# Patient Record
Sex: FEMALE | Race: WHITE | NOT HISPANIC OR LATINO | Employment: PART TIME | URBAN - METROPOLITAN AREA
[De-identification: names, ages, dates, MRNs, and addresses within clinical notes are randomized per-mention and may not be internally consistent; named-entity substitution may affect disease eponyms.]

---

## 2023-09-13 ENCOUNTER — APPOINTMENT (EMERGENCY)
Dept: RADIOLOGY | Facility: HOSPITAL | Age: 33
End: 2023-09-13

## 2023-09-13 ENCOUNTER — HOSPITAL ENCOUNTER (EMERGENCY)
Facility: HOSPITAL | Age: 33
Discharge: HOME | End: 2023-09-13
Attending: EMERGENCY MEDICINE | Admitting: EMERGENCY MEDICINE

## 2023-09-13 VITALS
BODY MASS INDEX: 28.59 KG/M2 | HEART RATE: 68 BPM | TEMPERATURE: 97.8 F | RESPIRATION RATE: 16 BRPM | SYSTOLIC BLOOD PRESSURE: 117 MMHG | WEIGHT: 161.38 LBS | OXYGEN SATURATION: 100 % | DIASTOLIC BLOOD PRESSURE: 80 MMHG | HEIGHT: 63 IN

## 2023-09-13 DIAGNOSIS — S93.401A RIGHT ANKLE SPRAIN: Primary | ICD-10-CM

## 2023-09-13 PROCEDURE — 99283 EMERGENCY DEPT VISIT LOW MDM: CPT

## 2023-09-13 PROCEDURE — 73610 X-RAY EXAM OF ANKLE: CPT

## 2023-09-13 RX ORDER — LAMOTRIGINE 200 MG/1
200 TABLET ORAL DAILY
COMMUNITY

## 2023-09-13 NOTE — Clinical Note
Tirsoannitashruti Johns was seen and treated in our emergency department on 9/13/2023. Diagnosis:     Padmini Burger  . She may return on this date: 09/15/2023         If you have any questions or concerns, please don't hesitate to call.       Echo Mckinley RN    ______________________________           _______________          _______________  Hospital Representative                              Date                                Time

## 2023-09-14 NOTE — ED PROVIDER NOTES
History  Chief Complaint   Patient presents with   • Ankle Pain     Right ankle injury a month and a half ago. Patient reports pain and swelling not getting better. Patient presents for evaluation of right ankle pain and swelling. Patient states she twisted it 1.5 months ago. States still swells up at the end of the day. Did not have it examined previously. Is able to bear weight. History provided by:  Patient   used: No    Ankle Pain      Prior to Admission Medications   Prescriptions Last Dose Informant Patient Reported? Taking?   lamoTRIgine (LaMICtal) 200 MG tablet   Yes Yes   Sig: Take 200 mg by mouth daily      Facility-Administered Medications: None       History reviewed. No pertinent past medical history. History reviewed. No pertinent surgical history. History reviewed. No pertinent family history. I have reviewed and agree with the history as documented. E-Cigarette/Vaping   • E-Cigarette Use Former User      E-Cigarette/Vaping Substances   • Flavoring Yes      Social History     Tobacco Use   • Smoking status: Every Day     Packs/day: 0.25     Types: Cigarettes   • Smokeless tobacco: Never   Vaping Use   • Vaping Use: Former   • Substances: Flavoring   Substance Use Topics   • Alcohol use: Never   • Drug use: Yes     Types: Marijuana       Review of Systems   All other systems reviewed and are negative. Physical Exam  Physical Exam  Vitals and nursing note reviewed. Constitutional:       General: She is not in acute distress. Musculoskeletal:         General: Tenderness present. No deformity. Normal range of motion. Feet:    Neurological:      General: No focal deficit present. Mental Status: She is alert and oriented to person, place, and time.          Vital Signs  ED Triage Vitals   Temperature Pulse Respirations Blood Pressure SpO2   09/13/23 1608 09/13/23 1502 09/13/23 1502 09/13/23 1502 09/13/23 1502   97.8 °F (36.6 °C) 68 16 117/80 100 % Temp Source Heart Rate Source Patient Position - Orthostatic VS BP Location FiO2 (%)   09/13/23 1502 09/13/23 1502 -- -- --   Oral Monitor         Pain Score       09/13/23 1502       7           Vitals:    09/13/23 1502   BP: 117/80   Pulse: 68         Visual Acuity      ED Medications  Medications - No data to display    Diagnostic Studies  Results Reviewed     None                 XR ankle 3+ views RIGHT   Final Result by Gwenetta Klinefelter, MD (09/13 1602)      No acute osseous abnormality. Workstation performed: [id number]                    Procedures  Procedures         ED Course                               SBIRT 22yo+    Flowsheet Row Most Recent Value   Initial Alcohol Screen: US AUDIT-C     1. How often do you have a drink containing alcohol? 0 Filed at: 09/13/2023 1509   Audit-C Score 0 Filed at: 09/13/2023 1509   SHABNAM: How many times in the past year have you. .. Used an illegal drug or used a prescription medication for non-medical reasons? Never Filed at: 09/13/2023 1509                    Medical Decision Making  Pulse ox 100% on RA indicating adequate oxygenation  Xray R ankle: no fx or dislocation as read by me    Right ankle sprain: acute illness or injury  Amount and/or Complexity of Data Reviewed  Radiology: ordered and independent interpretation performed. Disposition  Final diagnoses:   Right ankle sprain     Time reflects when diagnosis was documented in both MDM as applicable and the Disposition within this note     Time User Action Codes Description Comment    9/13/2023  3:59 PM Jojo Harper [O53.225J] Right ankle sprain       ED Disposition     ED Disposition   Discharge    Condition   Stable    Date/Time   Wed Sep 13, 2023  3:59 PM    [address] [place] [address] discharge to home/self care.                Follow-up Information    None         Discharge Medication List as of 9/13/2023  3:59 PM      CONTINUE these medications which have NOT CHANGED    Details lamoTRIgine (LaMICtal) 200 MG tablet Take 200 mg by mouth daily, Historical Med                 PDMP Review     None          ED Provider  Electronically Signed by           Meryle Doctor, DO  09/14/23 8146

## 2023-09-19 ENCOUNTER — TELEPHONE (OUTPATIENT)
Age: 33
End: 2023-09-19

## 2023-09-19 NOTE — TELEPHONE ENCOUNTER
Caller: pt    Doctor: Manjinder Fu    Reason for call: Unable to make appt for today would like to know if she can be prescribed medication until her rescheduled appt. Advised her to reach out to primary since she has not  Been seen before state she does not have one.      Call back#: [phone]

## 2023-09-19 NOTE — TELEPHONE ENCOUNTER
Called and [facility] for Mary Ann Jacksontemitope and advised she is not under our care yet, so we cannot prescribe any medication or give medication advise until her appointment. She can try to get in with PCP, possibly [facility]s In, or urgent care if pain is that bad. I apologized for the inconvenience but she is not established with our practice yet.

## 2023-09-20 ENCOUNTER — TELEPHONE (OUTPATIENT)
Age: 33
End: 2023-09-20

## 2023-09-20 NOTE — TELEPHONE ENCOUNTER
Please set this up and contact patient. Please let her know that this is not a guaranteed ride this is a courtesy. [employer] calls to request the ride and provided the payment. There is also no insurance on file, please make sure patient is aware of self pay policy.

## 2023-09-20 NOTE — TELEPHONE ENCOUNTER
Chief Complaint   Patient presents with    Headache     started 3-4 days ago with all symptoms    Cough    Vomiting     nauseated at random times. vomits mainly in the AM   [facility] Follow Up     seen at [facility] [place] for headaches and blood sugar on 10/16/17     \"REVIEWED RECORD IN PREPARATION FOR VISIT AND HAVE OBTAINED THE NECESSARY DOCUMENTATION\"  1. Have you been to the ER, urgent care clinic since your last visit? Hospitalized since your last visit? Yes Reason for visit: see above    2. Have you seen or consulted any other health care providers outside of the [address] [place] [address] since your last visit? Include any pap smears or colon screening. No  Patient does not have advanced directives. Spoke with patient, advised that lyft ride has been set up, relayed message below. Also advised patient that if her insurance is the [facility] we unfortunately do not participate with that insurance and she would need to call her insurance to find another provider. Patient stated she does believe it was the [facility] but will call back to verify her insurance before her appt.

## 2023-09-20 NOTE — TELEPHONE ENCOUNTER
Caller: Patient    Doctor: Jamye Mccartney    Reason for call:     Patient is requesting a lyft for Dr appointment on 9/27/23 at 10:30 am for Dr Jayme Mccartney at [place] [address]. Please give her a call  Back to help to schedule.     Call back#: [phone]

## 2023-10-04 ENCOUNTER — HOSPITAL ENCOUNTER (EMERGENCY)
Facility: HOSPITAL | Age: 33
Discharge: HOME | End: 2023-10-04
Attending: EMERGENCY MEDICINE

## 2023-10-04 VITALS
TEMPERATURE: 99.8 F | SYSTOLIC BLOOD PRESSURE: 120 MMHG | HEART RATE: 88 BPM | OXYGEN SATURATION: 97 % | DIASTOLIC BLOOD PRESSURE: 86 MMHG | RESPIRATION RATE: 16 BRPM

## 2023-10-04 DIAGNOSIS — F31.9 BIPOLAR DISORDER (HCC): ICD-10-CM

## 2023-10-04 DIAGNOSIS — Z76.0 MEDICATION REFILL: Primary | ICD-10-CM

## 2023-10-04 PROCEDURE — 99281 EMR DPT VST MAYX REQ PHY/QHP: CPT

## 2023-10-04 PROCEDURE — 99284 EMERGENCY DEPT VISIT MOD MDM: CPT | Performed by: EMERGENCY MEDICINE

## 2023-10-04 RX ORDER — LAMOTRIGINE 200 MG/1
200 TABLET ORAL DAILY
Qty: 30 TABLET | Refills: 0 | Status: SHIPPED | OUTPATIENT
Start: 2023-10-04

## 2023-10-04 NOTE — ED PROVIDER NOTES
History  Chief Complaint   Patient presents with   • Medication Refill     Only has one lexapro. Just moved from [place]     68-year-old female with past history of bipolar disorder, presents to the ED for medication refill. Patient states that she recently moved from [place] to [facility] about 2 months ago. Patient has had some insurance issues. Patient recently obtained new insurance. Patient takes Lamictal once daily for her bipolar disorder. Patient ran out of this medication today. Subsequently patient came to the ED for medication refill. Patient has no other complaints. History provided by:  Patient  Medication Refill      Prior to Admission Medications   Prescriptions Last Dose Informant Patient Reported? Taking?   lamoTRIgine (LaMICtal) 200 MG tablet   Yes No   Sig: Take 200 mg by mouth daily      Facility-Administered Medications: None       Past Medical History:   Diagnosis Date   • Psychiatric disorder        Past Surgical History:   Procedure Laterality Date   •  SECTION         History reviewed. No pertinent family history. I have reviewed and agree with the history as documented. E-Cigarette/Vaping   • E-Cigarette Use Current Every Day User      E-Cigarette/Vaping Substances   • Flavoring Yes      Social History     Tobacco Use   • Smoking status: Every Day     Packs/day: 0.25     Types: Cigarettes   • Smokeless tobacco: Never   Vaping Use   • Vaping Use: Every day   • Substances: Flavoring   Substance Use Topics   • Alcohol use: Yes     Comment: socially   • Drug use: Yes     Types: Marijuana       Review of Systems   Constitutional: Negative for chills and fever. HENT: Negative for ear pain and sore throat. Eyes: Negative for pain and visual disturbance. Respiratory: Negative for cough and shortness of breath. Cardiovascular: Negative for chest pain and palpitations. Gastrointestinal: Negative for abdominal pain and vomiting.    Genitourinary: Negative for dysuria and hematuria. Musculoskeletal: Negative for arthralgias and back pain. Skin: Negative for color change and rash. Neurological: Negative for seizures and syncope. All other systems reviewed and are negative. Physical Exam  Physical Exam  Vitals and nursing note reviewed. Constitutional:       General: She is not in acute distress. Appearance: She is well-developed. HENT:      Head: Normocephalic and atraumatic. Eyes:      Conjunctiva/sclera: Conjunctivae normal.   Cardiovascular:      Rate and Rhythm: Normal rate and regular rhythm. Heart sounds: No murmur heard. Pulmonary:      Effort: Pulmonary effort is normal. No respiratory distress. Breath sounds: Normal breath sounds. Abdominal:      Palpations: Abdomen is soft. Tenderness: There is no abdominal tenderness. Musculoskeletal:         General: No swelling. Cervical back: Neck supple. Skin:     General: Skin is warm and dry. Capillary Refill: Capillary refill takes less than 2 seconds. Neurological:      Mental Status: She is alert. Psychiatric:         Mood and Affect: Mood normal.         Vital Signs  ED Triage Vitals [10/04/23 1530]   Temperature Pulse Respirations Blood Pressure SpO2   99.8 °F (37.7 °C) 88 16 120/86 97 %      Temp Source Heart Rate Source Patient Position - Orthostatic VS BP Location FiO2 (%)   Tympanic Monitor Sitting Right arm --      Pain Score       No Pain           Vitals:    10/04/23 1530   BP: 120/86   Pulse: 88   Patient Position - Orthostatic VS: Sitting         Visual Acuity      ED Medications  Medications - No data to display    Diagnostic Studies  Results Reviewed     None                 No orders to display              Procedures  Procedures         ED Course                                             Medical Decision Making  Patient presented for medication refill. Patient's Lamictal was refilled after verifying her prescription bottle.   At this time patient referred to some PCPs in our network to establish visit with her primary care physician. Close return instructions given to return to the ER for any worsening symptoms. Patient agrees with discharge plan. Patient well appearing at time of discharge. Please Note: Fluency Direct voice recognition software may have been used in the creation of this document. Wrong words or sound a like substitutions may have occurred due to the inherent limitations of the voice software. Risk  Prescription drug management. Disposition  Final diagnoses:   Medication refill   Bipolar disorder ([address])     Time reflects when diagnosis was documented in both MDM as applicable and the Disposition within this note     Time User Action Codes Description Comment    10/4/2023  3:48 PM Cristin Ray Add [Z76.0] Medication refill     10/4/2023  3:48 PM Cristin Ray Add [F31.9] Bipolar disorder [facility])       ED Disposition     ED Disposition   Discharge    Condition   Stable    Date/Time   Wed Oct 4, 2023  3:48 PM    [address] [facility] discharge to home/self care. Follow-up Information     Follow up With Specialties Details Why [facility] Schedule an appointment as soon as possible for a visit in 2 days  [address] [facility]. Schedule an appointment as soon as possible for a visit  Please call to establish visit with a primary care physician. [address]  [phone]            Patient's Medications   Discharge Prescriptions    LAMOTRIGINE (LAMICTAL) 200 MG TABLET    Take 1 tablet (200 mg total) by mouth daily       Start Date: 10/4/2023 End Date: --       Order Dose: 200 mg       Quantity: 30 tablet    Refills: 0       No discharge procedures on file.     PDMP Review     None          ED Provider  Electronically Signed by           Karen Saba DO Vanessa  10/04/23 1552

## 2023-11-02 ENCOUNTER — HOSPITAL ENCOUNTER (EMERGENCY)
Facility: HOSPITAL | Age: 33
Discharge: HOME | End: 2023-11-02
Attending: EMERGENCY MEDICINE | Admitting: EMERGENCY MEDICINE
Payer: COMMERCIAL

## 2023-11-02 VITALS
WEIGHT: 149.91 LBS | RESPIRATION RATE: 16 BRPM | OXYGEN SATURATION: 99 % | DIASTOLIC BLOOD PRESSURE: 88 MMHG | TEMPERATURE: 98.1 F | SYSTOLIC BLOOD PRESSURE: 133 MMHG | HEIGHT: 63 IN | HEART RATE: 86 BPM | BODY MASS INDEX: 26.56 KG/M2

## 2023-11-02 DIAGNOSIS — Z76.0 ENCOUNTER FOR MEDICATION REFILL: Primary | ICD-10-CM

## 2023-11-02 DIAGNOSIS — F31.9 BIPOLAR DISORDER (HCC): ICD-10-CM

## 2023-11-02 PROCEDURE — 99281 EMR DPT VST MAYX REQ PHY/QHP: CPT

## 2023-11-02 PROCEDURE — 99284 EMERGENCY DEPT VISIT MOD MDM: CPT | Performed by: PHYSICIAN ASSISTANT

## 2023-11-02 RX ORDER — LAMOTRIGINE 200 MG/1
200 TABLET ORAL DAILY
Qty: 30 TABLET | Refills: 0 | Status: SHIPPED | OUTPATIENT
Start: 2023-11-02

## 2023-11-02 NOTE — ED PROVIDER NOTES
History  Chief Complaint   Patient presents with    Medication Refill     Pt. Is unable to see a pcp until her insurance  clears. Needs a refill on her Lamictal 200mg once a day. Last took it yesterday     33 y/o female presenting requesting medication refill of her Lamictal.  States that she is waiting for her insurance to go through so that she can get a PCP appointment. Has not missed any doses. Prior to Admission Medications   Prescriptions Last Dose Informant Patient Reported? Taking?   lamoTRIgine (LaMICtal) 200 MG tablet   Yes No   Sig: Take 200 mg by mouth daily   lamoTRIgine (LaMICtal) 200 MG tablet 2023  No Yes   Sig: Take 1 tablet (200 mg total) by mouth daily   lamoTRIgine (LaMICtal) 200 MG tablet   No Yes   Sig: Take 1 tablet (200 mg total) by mouth daily      Facility-Administered Medications: None       Past Medical History:   Diagnosis Date    Psychiatric disorder        Past Surgical History:   Procedure Laterality Date     SECTION         History reviewed. No pertinent family history. I have reviewed and agree with the history as documented. E-Cigarette/Vaping    E-Cigarette Use Current Every Day User      E-Cigarette/Vaping Substances    Nicotine Yes     Flavoring Yes      Social History     Tobacco Use    Smoking status: Every Day     Packs/day: 0.25     Types: Cigarettes    Smokeless tobacco: Never   Vaping Use    Vaping Use: Every day    Substances: Nicotine, Flavoring   Substance Use Topics    Alcohol use: Yes     Comment: socially    Drug use: Not Currently     Types: Marijuana       Review of Systems   Constitutional: Negative. Negative for chills, fatigue and fever. HENT: Negative. Negative for congestion, postnasal drip, rhinorrhea and sore throat. Eyes: Negative. Respiratory: Negative. Negative for cough, shortness of breath and wheezing. Cardiovascular: Negative. Gastrointestinal: Negative.   Negative for abdominal pain, diarrhea, nausea and vomiting. Endocrine: Negative. Genitourinary: Negative. Musculoskeletal: Negative. Skin: Negative. Neurological: Negative. Hematological: Negative. Psychiatric/Behavioral: Negative. All other systems reviewed and are negative. Physical Exam  Physical Exam  Vitals and nursing note reviewed. Constitutional:       Appearance: Normal appearance. HENT:      Head: Normocephalic and atraumatic. Right Ear: External ear normal.      Left Ear: External ear normal.      Nose: Nose normal.   Eyes:      Conjunctiva/sclera: Conjunctivae normal.   Cardiovascular:      Rate and Rhythm: Normal rate. Pulmonary:      Effort: Pulmonary effort is normal.   Abdominal:      General: There is no distension. Musculoskeletal:         General: No deformity. Normal range of motion. Cervical back: Normal range of motion. Skin:     General: Skin is dry. Findings: No rash. Neurological:      General: No focal deficit present. Mental Status: She is alert and oriented to person, place, and time. Mental status is at baseline. Psychiatric:         Mood and Affect: Mood normal.         Behavior: Behavior normal.         Thought Content: Thought content normal.         Judgment: Judgment normal.         Vital Signs  ED Triage Vitals [11/02/23 1619]   Temperature Pulse Respirations Blood Pressure SpO2   98.1 °F (36.7 °C) 86 16 133/88 99 %      Temp Source Heart Rate Source Patient Position - Orthostatic VS BP Location FiO2 (%)   Oral -- -- -- --      Pain Score       --           Vitals:    11/02/23 1619   BP: 133/88   Pulse: 86         Visual Acuity      ED Medications  Medications - No data to display    Diagnostic Studies  Results Reviewed       None                   No orders to display              Procedures  Procedures         ED Course                               SBIRT 20yo+      Flowsheet Row Most Recent Value   Initial Alcohol Screen: US AUDIT-C     1.  How often do you have a drink containing alcohol? 0 Filed at: 11/02/2023 1620   2. How many drinks containing alcohol do you have on a typical day you are drinking? 0 Filed at: 11/02/2023 1620   3a. Male UNDER 65: How often do you have five or more drinks on one occasion? 0 Filed at: 11/02/2023 1620   3b. FEMALE Any Age, or MALE 65+: How often do you have 4 or more drinks on one occassion? 0 Filed at: 11/02/2023 1620   Audit-C Score 0 Filed at: 11/02/2023 1620   SHABNAM: How many times in the past year have you. .. Used an illegal drug or used a prescription medication for non-medical reasons? Never Filed at: 11/02/2023 1620                      Medical Decision Making  Discussed importance of obtaining PCP for further refills. Patient in agreement. Risk  Prescription drug management. Disposition  Final diagnoses:   Encounter for medication refill     Time reflects when diagnosis was documented in both MDM as applicable and the Disposition within this note       Time User Action Codes Description Comment    11/2/2023  4:23 PM Elida Harper [Z76.0] Encounter for medication refill     11/2/2023  4:23 PM Elida Harper [F31.9] Bipolar disorder [facility])           ED Disposition       ED Disposition   Discharge    Condition   Stable    Date/Time   Thu Nov 2, 2023 [address] discharge to home/self care.                    Follow-up Information       Follow up With Specialties Details Why Contact Info Additional Information    [address] Emergency Department Emergency Medicine Go to  If symptoms worsen, otherwise please follow up with your family doctor for further refills [address] [address]  [address] [facility] Emergency Department, [address], [address]            Current Discharge Medication List        CONTINUE these medications which have CHANGED    Details   !! lamoTRIgine (LaMICtal) 200 MG tablet Take 1 tablet (200 mg total) by mouth daily  Qty: 30 tablet, Refills: 0    Associated Diagnoses: Bipolar disorder ([address])       ! ! - Potential duplicate medications found. Please discuss with provider. CONTINUE these medications which have NOT CHANGED    Details   !! lamoTRIgine (LaMICtal) 200 MG tablet Take 200 mg by mouth daily       ! ! - Potential duplicate medications found. Please discuss with provider. No discharge procedures on file.     PDMP Review       None            ED Provider  Electronically Signed by             Michael Fisher PA-C  11/02/23 6266

## 2023-11-29 ENCOUNTER — HOSPITAL ENCOUNTER (EMERGENCY)
Facility: HOSPITAL | Age: 33
Discharge: HOME | End: 2023-11-29
Attending: EMERGENCY MEDICINE
Payer: COMMERCIAL

## 2023-11-29 VITALS
DIASTOLIC BLOOD PRESSURE: 58 MMHG | RESPIRATION RATE: 18 BRPM | HEART RATE: 78 BPM | WEIGHT: 149 LBS | SYSTOLIC BLOOD PRESSURE: 116 MMHG | OXYGEN SATURATION: 98 % | TEMPERATURE: 97.6 F | BODY MASS INDEX: 26.39 KG/M2

## 2023-11-29 DIAGNOSIS — Z76.0 ENCOUNTER FOR MEDICATION REFILL: Primary | ICD-10-CM

## 2023-11-29 PROCEDURE — 99281 EMR DPT VST MAYX REQ PHY/QHP: CPT

## 2023-11-29 PROCEDURE — 99284 EMERGENCY DEPT VISIT MOD MDM: CPT | Performed by: EMERGENCY MEDICINE

## 2023-11-29 RX ORDER — LAMOTRIGINE 200 MG/1
200 TABLET ORAL DAILY
Qty: 30 TABLET | Refills: 0 | Status: SHIPPED | OUTPATIENT
Start: 2023-11-29

## 2023-11-29 NOTE — ED PROVIDER NOTES
History  Chief Complaint   Patient presents with    Medication Refill     Req lamictal refill, unable to get to PCP since her insurance isn't resolved. Was told it will take 45 days but it has been 108 days. Pt is a 27yo F who presents for medication refill. Patient reports she is on Lamictal and has been for a long time. Patient denies any recent dosage changes. Patient reports she has been attempting to establish PCP but needs insurance to do so. Patient reports she is working with the insurance people but has not yet established insurance and therefore has not established PCP. Patient reports she is on the Lamictal for anxiety, depression, and bipolar. Patient reports she has otherwise been feeling well and is only here for medication refill. Prior to Admission Medications   Prescriptions Last Dose Informant Patient Reported? Taking?   lamoTRIgine (LaMICtal) 200 MG tablet   No No   Sig: Take 1 tablet (200 mg total) by mouth daily      Facility-Administered Medications: None       Past Medical History:   Diagnosis Date    Psychiatric disorder        Past Surgical History:   Procedure Laterality Date     SECTION         History reviewed. No pertinent family history. I have reviewed and agree with the history as documented. E-Cigarette/Vaping    E-Cigarette Use Current Every Day User      E-Cigarette/Vaping Substances    Nicotine Yes     Flavoring Yes      Social History     Tobacco Use    Smoking status: Every Day     Packs/day: 0.25     Types: Cigarettes    Smokeless tobacco: Never   Vaping Use    Vaping Use: Every day    Substances: Nicotine, Flavoring   Substance Use Topics    Alcohol use: Yes     Comment: socially    Drug use: Not Currently     Types: Marijuana       Review of Systems   All other systems reviewed and are negative. Physical Exam  Physical Exam  Vitals reviewed. Constitutional:       General: She is not in acute distress. Appearance: She is well-developed. She is not toxic-appearing or diaphoretic. HENT:      Head: Normocephalic and atraumatic. Right Ear: External ear normal.      Left Ear: External ear normal.      Nose: Nose normal.      Mouth/Throat:      Pharynx: Oropharynx is clear. Eyes:      Extraocular Movements: Extraocular movements intact. Pupils: Pupils are equal, round, and reactive to light. Cardiovascular:      Rate and Rhythm: Normal rate and regular rhythm. Heart sounds: Normal heart sounds. Pulmonary:      Effort: Pulmonary effort is normal. No respiratory distress. Breath sounds: Normal breath sounds. Abdominal:      General: There is no distension. Palpations: Abdomen is soft. Tenderness: There is no abdominal tenderness. Musculoskeletal:         General: Normal range of motion. Cervical back: Normal range of motion and neck supple. Right lower leg: No edema. Left lower leg: No edema. Skin:     General: Skin is warm and dry. Capillary Refill: Capillary refill takes less than 2 seconds. Coloration: Skin is not pale. Findings: No erythema or rash. Neurological:      General: No focal deficit present. Mental Status: She is alert and oriented to person, place, and time. Psychiatric:         Speech: Speech normal.         Behavior: Behavior is cooperative.          Vital Signs  ED Triage Vitals [11/29/23 1513]   Temperature Pulse Respirations Blood Pressure SpO2   97.6 °F (36.4 °C) 78 18 116/58 98 %      Temp Source Heart Rate Source Patient Position - Orthostatic VS BP Location FiO2 (%)   Tympanic Monitor Sitting Right arm --      Pain Score       No Pain           Vitals:    11/29/23 1513   BP: 116/58   Pulse: 78   Patient Position - Orthostatic VS: Sitting         Visual Acuity      ED Medications  Medications - No data to display    Diagnostic Studies  Results Reviewed       None                   No orders to display              Procedures  Procedures         ED Course  ED Course as of 11/29/23 1615   Wed Nov 29, 2023   1513 Per chart review, pt received 30 day supply on 11/2. Medical Decision Making  Pt is a 27yo F who presents for medication refill. Will refill medication but also encourage patient to obtain PCP. Patient given family practice clinic information as I believe they take patients without insurance. Patient expressed understanding and will establish PCP. Plan to discharge pt with f/u to PCP. Discussed returning the ED with new symptoms. Discussed taking new medication as prescribed. Pt expressed understanding of discharge instructions, return precautions, and medication instructions and is stable for discharge at this time. All questions were answered and pt was discharged without incident. Risk  Prescription drug management. Disposition  Final diagnoses:   Encounter for medication refill     Time reflects when diagnosis was documented in both MDM as applicable and the Disposition within this note       Time User Action Codes Description Comment    11/29/2023  3:15 PM Brad Chamberlain Add [Z76.0] Encounter for medication refill           ED Disposition       ED Disposition   Discharge    Condition   Stable    Date/Time   Wed Nov 29, 2023  3:15 PM    [address] [place] [address] discharge to home/self care. Follow-up Information       Follow up With Specialties Details Why [address] [facility] Medicine Call on 11/30/2023  [address]  [phone]              Discharge Medication List as of 11/29/2023  3:17 PM        START taking these medications    Details   !! lamoTRIgine (LaMICtal) 200 MG tablet Take 1 tablet (200 mg total) by mouth daily, Starting Wed 11/29/2023, Normal       !! - Potential duplicate medications found. Please discuss with provider.         CONTINUE these medications which have NOT CHANGED Details   !! lamoTRIgine (LaMICtal) 200 MG tablet Take 1 tablet (200 mg total) by mouth daily, Starting u 11/2/2023, Normal       !! - Potential duplicate medications found. Please discuss with provider. No discharge procedures on file.     PDMP Review       None            ED Provider  Electronically Signed by             Dane Rodriguez MD  11/29/23 7857

## 2023-11-29 NOTE — DISCHARGE INSTRUCTIONS
Follow-up with primary care for further care. Contact info provided below if needed. Take your medications as prescribed. Return to the ED with new or worsening symptoms.

## 2023-12-06 ENCOUNTER — APPOINTMENT (EMERGENCY)
Dept: RADIOLOGY | Facility: HOSPITAL | Age: 33
End: 2023-12-06
Payer: COMMERCIAL

## 2023-12-06 ENCOUNTER — HOSPITAL ENCOUNTER (EMERGENCY)
Facility: HOSPITAL | Age: 33
Discharge: HOME | End: 2023-12-06
Attending: STUDENT IN AN ORGANIZED HEALTH CARE EDUCATION/TRAINING PROGRAM
Payer: COMMERCIAL

## 2023-12-06 VITALS
OXYGEN SATURATION: 100 % | RESPIRATION RATE: 16 BRPM | SYSTOLIC BLOOD PRESSURE: 146 MMHG | TEMPERATURE: 98.7 F | HEART RATE: 80 BPM | DIASTOLIC BLOOD PRESSURE: 93 MMHG

## 2023-12-06 DIAGNOSIS — M25.572 LEFT ANKLE PAIN: ICD-10-CM

## 2023-12-06 DIAGNOSIS — J06.9 VIRAL URI: Primary | ICD-10-CM

## 2023-12-06 LAB
FLUAV RNA RESP QL NAA+PROBE: NEGATIVE
FLUBV RNA RESP QL NAA+PROBE: NEGATIVE
RSV RNA RESP QL NAA+PROBE: NEGATIVE
S PYO DNA THROAT QL NAA+PROBE: NOT DETECTED
SARS-COV-2 RNA RESP QL NAA+PROBE: NEGATIVE

## 2023-12-06 PROCEDURE — 0241U HB NFCT DS VIR RESP RNA 4 TRGT: CPT | Performed by: STUDENT IN AN ORGANIZED HEALTH CARE EDUCATION/TRAINING PROGRAM

## 2023-12-06 PROCEDURE — 99283 EMERGENCY DEPT VISIT LOW MDM: CPT

## 2023-12-06 PROCEDURE — 87651 STREP A DNA AMP PROBE: CPT | Performed by: STUDENT IN AN ORGANIZED HEALTH CARE EDUCATION/TRAINING PROGRAM

## 2023-12-06 PROCEDURE — 73610 X-RAY EXAM OF ANKLE: CPT

## 2023-12-06 PROCEDURE — 99284 EMERGENCY DEPT VISIT MOD MDM: CPT | Performed by: STUDENT IN AN ORGANIZED HEALTH CARE EDUCATION/TRAINING PROGRAM

## 2023-12-06 RX ORDER — ACETAMINOPHEN 325 MG/1
650 TABLET ORAL ONCE
Status: COMPLETED | OUTPATIENT
Start: 2023-12-06 | End: 2023-12-06

## 2023-12-06 RX ADMIN — ACETAMINOPHEN 650 MG: 325 TABLET ORAL at 18:55

## 2023-12-06 NOTE — ED PROVIDER NOTES
History  Chief Complaint   Patient presents with    Sore Throat     Sore throat and cough for a couple of days    Ankle Pain     L ankle swelling and pain for months. No known trauma     Patient is a 68-year-old female, no pertinent past medical history, who presents to the emergency department for both ankle pain as well as a sore throat. Sore throat has been present for several days. Pain is worse with swallowing. Tried DayQuil and Robitussin with some relief. No other modifying factors. Associated with cough, congestion, and a scratchy throat. Denies any fevers. Patient states the ankle pain started last night after stepping down and inverting her left ankle. Did not fall to the ground. Since then she has been able to walk on it but is painful. Denies any other injuries. She notes intermittent swelling in that ankle with past several months but it is worse after the injury last night. No other complaints or concerns. Prior to Admission Medications   Prescriptions Last Dose Informant Patient Reported? Taking?   lamoTRIgine (LaMICtal) 200 MG tablet   No No   Sig: Take 1 tablet (200 mg total) by mouth daily   lamoTRIgine (LaMICtal) 200 MG tablet   No No   Sig: Take 1 tablet (200 mg total) by mouth daily      Facility-Administered Medications: None       Past Medical History:   Diagnosis Date    Psychiatric disorder        Past Surgical History:   Procedure Laterality Date     SECTION         History reviewed. No pertinent family history. I have reviewed and agree with the history as documented. E-Cigarette/Vaping    E-Cigarette Use Current Every Day User      E-Cigarette/Vaping Substances    Nicotine Yes     Flavoring Yes      Social History     Tobacco Use    Smoking status: Every Day     Packs/day: 0.25     Types: Cigarettes    Smokeless tobacco: Never   Vaping Use    Vaping Use: Every day    Substances: Nicotine, Flavoring   Substance Use Topics    Alcohol use:  Yes Comment: socially    Drug use: Not Currently     Types: Marijuana       Review of Systems   Constitutional:  Negative for chills and fever. HENT:  Positive for sore throat. Musculoskeletal:         Ankle pain     Skin:  Negative for color change and wound. All other systems reviewed and are negative. Physical Exam  Physical Exam  Vitals and nursing note reviewed. Constitutional:       General: She is not in acute distress. Appearance: She is well-developed. She is not ill-appearing, toxic-appearing or diaphoretic. HENT:      Head: Normocephalic and atraumatic. Right Ear: External ear normal.      Left Ear: External ear normal.      Nose: Nose normal.      Mouth/Throat:      Mouth: Mucous membranes are moist. No oral lesions. Pharynx: Oropharynx is clear. Uvula midline. No oropharyngeal exudate or uvula swelling. Eyes:      General: Lids are normal. No scleral icterus. Cardiovascular:      Rate and Rhythm: Normal rate and regular rhythm. Heart sounds: Normal heart sounds. No murmur heard. No friction rub. No gallop. Pulmonary:      Effort: Pulmonary effort is normal. No respiratory distress. Breath sounds: Normal breath sounds. No wheezing or rales. Abdominal:      Palpations: Abdomen is soft. Tenderness: There is no abdominal tenderness. There is no guarding or rebound. Musculoskeletal:         General: No deformity. Normal range of motion. Cervical back: Normal range of motion and neck supple. Comments: There is no significant swelling noted. There is tenderness and ecchymosis just medial to the proximal aspect of the left lateral malleoli. No crepitus. Full range of motion of the left ankle. 2+ DP and PT pulse. There is no fibular head tenderness. Skin:     General: Skin is warm and dry. Neurological:      General: No focal deficit present. Mental Status: She is alert.    Psychiatric:         Mood and Affect: Mood normal. Behavior: Behavior normal.         Vital Signs  ED Triage Vitals [12/06/23 1734]   Temperature Pulse Respirations Blood Pressure SpO2   98.7 °F (37.1 °C) 80 16 146/93 100 %      Temp Source Heart Rate Source Patient Position - Orthostatic VS BP Location FiO2 (%)   Tympanic Monitor Sitting Right arm --      Pain Score       6           Vitals:    12/06/23 1734   BP: 146/93   Pulse: 80   Patient Position - Orthostatic VS: Sitting         Visual Acuity      ED Medications  Medications   acetaminophen (TYLENOL) tablet 650 mg (650 mg Oral Given 12/6/23 1855)       Diagnostic Studies  Results Reviewed       Procedure Component Value Units Date/Time    FLU/RSV/COVID - if FLU/RSV clinically relevant [[id number]]  (Normal) Collected: 12/06/23 1852    Lab Status: Final result Specimen: Nares from Nose Updated: 12/06/23 1943     SARS-CoV-2 Negative     INFLUENZA A PCR Negative     INFLUENZA B PCR Negative     RSV PCR Negative    Narrative:      FOR PEDIATRIC PATIENTS - copy/paste COVID Guidelines URL to browser: [email or web address]    SARS-CoV-2 assay is a Nucleic Acid Amplification assay intended for the  qualitative detection of nucleic acid from SARS-CoV-2 in nasopharyngeal  swabs. Results are for the presumptive identification of SARS-CoV-2 RNA. Positive results are indicative of infection with SARS-CoV-2, the virus  causing COVID-19, but do not rule out bacterial infection or co-infection  with other viruses. Laboratories within the [facility] and its  territories are required to report all positive results to the appropriate  public health authorities. Negative results do not preclude SARS-CoV-2  infection and should not be used as the sole basis for treatment or other  patient management decisions. Negative results must be combined with  clinical observations, patient history, and epidemiological information.   This test has not been FDA cleared or approved. This test has been authorized by FDA under an Emergency Use Authorization  (EUA). This test is only authorized for the duration of time the  declaration that circumstances exist justifying the authorization of the  emergency use of an in vitro diagnostic tests for detection of SARS-CoV-2  virus and/or diagnosis of COVID-19 infection under section 564(b)(1) of  the Act, 21 U. S.C. 149OSE-9(D)(2), unless the authorization is terminated  or revoked sooner. The test has been validated but independent review by FDA  and CLIA is pending. Test performed using [employer]: This RT-PCR assay targets N2,  a region unique to SARS-CoV-2. A conserved region in the E-gene was chosen  for pan-Sarbecovirus detection which includes SARS-CoV-2. According to CMS-2020-01-R, this platform meets the definition of high-throughput technology. Strep A PCR [[id number]]  (Normal) Collected: 12/06/23 1852    Lab Status: Final result Specimen: Throat Updated: 12/06/23 1931     STREP A PCR Not Detected                   XR ankle 3+ views LEFT   ED Interpretation by Sherrell Ritchie DO (12/06 1900)   No acute osseous abnormality                 Procedures  Procedures         ED Course                                             Medical Decision Making  Patient is a 34 y.o. female who presents to the ED for left ankle pain, viral URI symptoms. Patient is nontoxic, well-appearing. Vitals stable. Differential for ankle pain includes but is not limited to: Ankle sprain/strain. Low suspicion for fracture. Presentation not consistent with dislocation. Differential for viral URI symptoms includes COVID/flu/RSV, strep pharyngitis, viral pharyngitis. Presentation not consistent with pneumonia. X-rays were obtained which did not show any acute osseous abnormality. Patient was also tested for COVID/flu/RSV, and strep.   As there was no indication for further workup or treatment in the emergency department at this time will discharge. Discussed RICE. Return precautions discussed. Patient verbalized understanding and agreed with plan of care. Portions of the record may have been created with voice recognition software. Occasional wrong word or "sound a like" substitutions may have occurred due to the inherent limitations of voice recognition software. Read the chart carefully and recognize, using context, where substitutions have occurred. Problems Addressed:  Left ankle pain: acute illness or injury  Viral URI: acute illness or injury    Amount and/or Complexity of Data Reviewed  Labs: ordered. Radiology: ordered and independent interpretation performed. Details: No acute osseous abnormality    Risk  OTC drugs. Disposition  Final diagnoses:   Viral URI   Left ankle pain     Time reflects when diagnosis was documented in both MDM as applicable and the Disposition within this note       Time User Action Codes Description Comment    12/6/2023  7:09 PM Sandra Been Add [J06.9] Viral URI     12/6/2023  7:09 PM Marco Ala Been Add [M25.572] Left ankle pain           ED Disposition       ED Disposition   Discharge    Condition   Stable    Date/Time   Wed Dec 6, 2023  6:41 PM    [address] [place] [address] discharge to home/self care.                    Follow-up Information       Follow up With Specialties Details Why Contact Info Additional Information    Infolink  Call in 1 day  [address] [facility] Emergency Department Emergency Medicine   [address] [address]  [address] [facility] Emergency Department, [address] [facility] [address] [place] [address], [address] [place] [address], [facility] Podiatry   [address] [facility] [address]  [phone]               Discharge Medication List as of 12/6/2023  7:10 PM        CONTINUE these medications which have NOT CHANGED    Details   !! lamoTRIgine (LaMICtal) 200 MG tablet Take 1 tablet (200 mg total) by mouth daily, Starting Thu 11/2/2023, Normal      !! lamoTRIgine (LaMICtal) 200 MG tablet Take 1 tablet (200 mg total) by mouth daily, Starting Wed 11/29/2023, Normal       !! - Potential duplicate medications found. Please discuss with provider. No discharge procedures on file.     PDMP Review       None            ED Provider  Electronically Signed by             Suzie Green DO  12/07/23 0019

## 2023-12-07 NOTE — DISCHARGE INSTRUCTIONS
You were evaluated in the Emergency Department today for your congestion, sore throat, cough. Your evaluation suggests that your symptoms are most likely due to a viral illness, which will improve on its own with rest and fluids. You may take ibuprofen every 6 hours or tylenol every 6 hours as needed for fever. If you were tested for COVID/Flu/RSV, your results will appear on "MyChart" when ready. If you do not have a "MyChart", you can learn more here:    [email or web address]     You were also seen in the emergency room for left ankle pain. Your x-ray was negative. You were placed in an Aircast.  Please take Tylenol or Motrin as needed for pain. It is recommended you to rest, ice, and elevate your left ankle. Please follow-up with the podiatrist.  Please call to schedule an appointment. Please follow up with your primary care physician as soon as possible. If you do not have a primary doctor, you can call "Infolink" to schedule an appointment with one.      Return to the Emergency Department if you experience worsening cough, fever 100.4 ° F or greater not controlled by Tylenol or Ibuprofen, recurrent vomiting, chest pain, shortness of breath, or any other concerning symptoms

## 2023-12-12 ENCOUNTER — HOSPITAL ENCOUNTER (INPATIENT)
Facility: HOSPITAL | Age: 33
LOS: 4 days | Discharge: HOME | DRG: 773 | End: 2023-12-16
Attending: EMERGENCY MEDICINE | Admitting: EMERGENCY MEDICINE
Payer: COMMERCIAL

## 2023-12-12 ENCOUNTER — HOSPITAL ENCOUNTER (EMERGENCY)
Facility: HOSPITAL | Age: 33
End: 2023-12-12
Attending: EMERGENCY MEDICINE
Payer: COMMERCIAL

## 2023-12-12 VITALS
HEART RATE: 120 BPM | WEIGHT: 165.6 LBS | BODY MASS INDEX: 29.33 KG/M2 | RESPIRATION RATE: 20 BRPM | DIASTOLIC BLOOD PRESSURE: 103 MMHG | OXYGEN SATURATION: 97 % | TEMPERATURE: 98 F | SYSTOLIC BLOOD PRESSURE: 158 MMHG

## 2023-12-12 DIAGNOSIS — F10.20 ALCOHOL USE DISORDER, SEVERE, DEPENDENCE (HCC): ICD-10-CM

## 2023-12-12 DIAGNOSIS — F11.20 OPIOID USE DISORDER, MODERATE, DEPENDENCE (HCC): Primary | ICD-10-CM

## 2023-12-12 DIAGNOSIS — F10.10 ALCOHOL ABUSE: Primary | ICD-10-CM

## 2023-12-12 PROBLEM — F10.939 ALCOHOL WITHDRAWAL SYNDROME WITH COMPLICATION (HCC): Status: ACTIVE | Noted: 2023-12-12

## 2023-12-12 PROBLEM — F17.200 TOBACCO USE DISORDER: Status: ACTIVE | Noted: 2023-12-12

## 2023-12-12 PROBLEM — F41.9 ANXIETY AND DEPRESSION: Status: ACTIVE | Noted: 2023-12-12

## 2023-12-12 PROBLEM — F32.A ANXIETY AND DEPRESSION: Status: ACTIVE | Noted: 2023-12-12

## 2023-12-12 LAB
ALBUMIN SERPL BCP-MCNC: 4.3 G/DL (ref 3.5–5)
ALP SERPL-CCNC: 76 U/L (ref 34–104)
ALT SERPL W P-5'-P-CCNC: 25 U/L (ref 7–52)
AMPHETAMINES SERPL QL SCN: NEGATIVE
ANION GAP SERPL CALCULATED.3IONS-SCNC: 9 MMOL/L
AST SERPL W P-5'-P-CCNC: 34 U/L (ref 13–39)
BARBITURATES UR QL: NEGATIVE
BASOPHILS # BLD AUTO: 0.07 THOUSANDS/ÂΜL (ref 0–0.1)
BASOPHILS NFR BLD AUTO: 1 % (ref 0–1)
BENZODIAZ UR QL: NEGATIVE
BILIRUB SERPL-MCNC: 0.23 MG/DL (ref 0.2–1)
BILIRUB UR QL STRIP: NEGATIVE
BUN SERPL-MCNC: 7 MG/DL (ref 5–25)
CALCIUM SERPL-MCNC: 8.6 MG/DL (ref 8.4–10.2)
CHLORIDE SERPL-SCNC: 108 MMOL/L (ref 96–108)
CLARITY UR: CLEAR
CO2 SERPL-SCNC: 26 MMOL/L (ref 21–32)
COCAINE UR QL: NEGATIVE
COLOR UR: NORMAL
CREAT SERPL-MCNC: 0.66 MG/DL (ref 0.6–1.3)
EOSINOPHIL # BLD AUTO: 0 THOUSAND/ÂΜL (ref 0–0.61)
EOSINOPHIL NFR BLD AUTO: 0 % (ref 0–6)
ERYTHROCYTE [DISTWIDTH] IN BLOOD BY AUTOMATED COUNT: 14.6 % (ref 11.6–15.1)
ETHANOL SERPL-MCNC: 343 MG/DL
EXT PREGNANCY TEST URINE: NEGATIVE
EXT. CONTROL: NORMAL
GFR SERPL CREATININE-BSD FRML MDRD: 118 ML/MIN/1.73SQ M
GLUCOSE SERPL-MCNC: 84 MG/DL (ref 65–140)
GLUCOSE UR STRIP-MCNC: NEGATIVE MG/DL
HCT VFR BLD AUTO: 37.3 % (ref 34.8–46.1)
HGB BLD-MCNC: 12.5 G/DL (ref 11.5–15.4)
HGB UR QL STRIP.AUTO: NEGATIVE
IMM GRANULOCYTES # BLD AUTO: 0.06 THOUSAND/UL (ref 0–0.2)
IMM GRANULOCYTES NFR BLD AUTO: 1 % (ref 0–2)
KETONES UR STRIP-MCNC: NEGATIVE MG/DL
LEUKOCYTE ESTERASE UR QL STRIP: NEGATIVE
LYMPHOCYTES # BLD AUTO: 4.18 THOUSANDS/ÂΜL (ref 0.6–4.47)
LYMPHOCYTES NFR BLD AUTO: 44 % (ref 14–44)
MCH RBC QN AUTO: 29.6 PG (ref 26.8–34.3)
MCHC RBC AUTO-ENTMCNC: 33.5 G/DL (ref 31.4–37.4)
MCV RBC AUTO: 88 FL (ref 82–98)
METHADONE UR QL: NEGATIVE
MONOCYTES # BLD AUTO: 0.52 THOUSAND/ÂΜL (ref 0.17–1.22)
MONOCYTES NFR BLD AUTO: 6 % (ref 4–12)
NEUTROPHILS # BLD AUTO: 4.65 THOUSANDS/ÂΜL (ref 1.85–7.62)
NEUTS SEG NFR BLD AUTO: 48 % (ref 43–75)
NITRITE UR QL STRIP: NEGATIVE
NRBC BLD AUTO-RTO: 0 /100 WBCS
OPIATES UR QL SCN: NEGATIVE
OXYCODONE+OXYMORPHONE UR QL SCN: NEGATIVE
PCP UR QL: NEGATIVE
PH UR STRIP.AUTO: 6 [PH]
PLATELET # BLD AUTO: 281 THOUSANDS/UL (ref 149–390)
PMV BLD AUTO: 9.5 FL (ref 8.9–12.7)
POTASSIUM SERPL-SCNC: 3.5 MMOL/L (ref 3.5–5.3)
PROT SERPL-MCNC: 7 G/DL (ref 6.4–8.4)
PROT UR STRIP-MCNC: NEGATIVE MG/DL
RBC # BLD AUTO: 4.23 MILLION/UL (ref 3.81–5.12)
SODIUM SERPL-SCNC: 143 MMOL/L (ref 135–147)
SP GR UR STRIP.AUTO: <=1.005 (ref 1–1.03)
THC UR QL: NEGATIVE
UROBILINOGEN UR QL STRIP.AUTO: 0.2 E.U./DL
WBC # BLD AUTO: 9.48 THOUSAND/UL (ref 4.31–10.16)

## 2023-12-12 PROCEDURE — 96360 HYDRATION IV INFUSION INIT: CPT

## 2023-12-12 PROCEDURE — 80307 DRUG TEST PRSMV CHEM ANLYZR: CPT | Performed by: EMERGENCY MEDICINE

## 2023-12-12 PROCEDURE — 81025 URINE PREGNANCY TEST: CPT | Performed by: EMERGENCY MEDICINE

## 2023-12-12 PROCEDURE — 93005 ELECTROCARDIOGRAM TRACING: CPT

## 2023-12-12 PROCEDURE — 99285 EMERGENCY DEPT VISIT HI MDM: CPT | Performed by: EMERGENCY MEDICINE

## 2023-12-12 PROCEDURE — 82077 ASSAY SPEC XCP UR&BREATH IA: CPT | Performed by: EMERGENCY MEDICINE

## 2023-12-12 PROCEDURE — 81003 URINALYSIS AUTO W/O SCOPE: CPT | Performed by: EMERGENCY MEDICINE

## 2023-12-12 PROCEDURE — 85025 COMPLETE CBC W/AUTO DIFF WBC: CPT | Performed by: EMERGENCY MEDICINE

## 2023-12-12 PROCEDURE — 80053 COMPREHEN METABOLIC PANEL: CPT | Performed by: EMERGENCY MEDICINE

## 2023-12-12 PROCEDURE — HZ2ZZZZ DETOXIFICATION SERVICES FOR SUBSTANCE ABUSE TREATMENT: ICD-10-PCS | Performed by: EMERGENCY MEDICINE

## 2023-12-12 PROCEDURE — 99284 EMERGENCY DEPT VISIT MOD MDM: CPT

## 2023-12-12 PROCEDURE — 36415 COLL VENOUS BLD VENIPUNCTURE: CPT | Performed by: EMERGENCY MEDICINE

## 2023-12-12 PROCEDURE — 96361 HYDRATE IV INFUSION ADD-ON: CPT

## 2023-12-12 RX ORDER — FOLIC ACID 1 MG/1
1 TABLET ORAL DAILY
Status: DISCONTINUED | OUTPATIENT
Start: 2023-12-13 | End: 2023-12-16 | Stop reason: HOSPADM

## 2023-12-12 RX ORDER — LANOLIN ALCOHOL/MO/W.PET/CERES
100 CREAM (GRAM) TOPICAL DAILY
Status: CANCELLED | OUTPATIENT
Start: 2023-12-13

## 2023-12-12 RX ORDER — FOLIC ACID 1 MG/1
1 TABLET ORAL DAILY
Status: CANCELLED | OUTPATIENT
Start: 2023-12-13

## 2023-12-12 RX ORDER — NICOTINE 21 MG/24HR
1 PATCH, TRANSDERMAL 24 HOURS TRANSDERMAL DAILY
Status: CANCELLED | OUTPATIENT
Start: 2023-12-13

## 2023-12-12 RX ORDER — NICOTINE 21 MG/24HR
21 PATCH, TRANSDERMAL 24 HOURS TRANSDERMAL ONCE
Status: DISCONTINUED | OUTPATIENT
Start: 2023-12-12 | End: 2023-12-12 | Stop reason: HOSPADM

## 2023-12-12 RX ORDER — NICOTINE 21 MG/24HR
1 PATCH, TRANSDERMAL 24 HOURS TRANSDERMAL DAILY
Status: DISCONTINUED | OUTPATIENT
Start: 2023-12-13 | End: 2023-12-16 | Stop reason: HOSPADM

## 2023-12-12 RX ORDER — ENOXAPARIN SODIUM 100 MG/ML
40 INJECTION SUBCUTANEOUS DAILY
Status: DISCONTINUED | OUTPATIENT
Start: 2023-12-13 | End: 2023-12-16 | Stop reason: HOSPADM

## 2023-12-12 RX ORDER — ONDANSETRON 2 MG/ML
4 INJECTION INTRAMUSCULAR; INTRAVENOUS EVERY 6 HOURS PRN
Status: DISCONTINUED | OUTPATIENT
Start: 2023-12-12 | End: 2023-12-16 | Stop reason: HOSPADM

## 2023-12-12 RX ORDER — TRAZODONE HYDROCHLORIDE 50 MG/1
50 TABLET ORAL
Status: DISCONTINUED | OUTPATIENT
Start: 2023-12-12 | End: 2023-12-16 | Stop reason: HOSPADM

## 2023-12-12 RX ORDER — ONDANSETRON 2 MG/ML
4 INJECTION INTRAMUSCULAR; INTRAVENOUS EVERY 6 HOURS PRN
Status: CANCELLED | OUTPATIENT
Start: 2023-12-12

## 2023-12-12 RX ORDER — ACETAMINOPHEN 325 MG/1
650 TABLET ORAL EVERY 6 HOURS PRN
Status: DISCONTINUED | OUTPATIENT
Start: 2023-12-12 | End: 2023-12-16 | Stop reason: HOSPADM

## 2023-12-12 RX ORDER — IBUPROFEN 600 MG/1
600 TABLET ORAL ONCE
Status: DISCONTINUED | OUTPATIENT
Start: 2023-12-12 | End: 2023-12-12

## 2023-12-12 RX ORDER — ENOXAPARIN SODIUM 100 MG/ML
40 INJECTION SUBCUTANEOUS DAILY
Status: CANCELLED | OUTPATIENT
Start: 2023-12-13

## 2023-12-12 RX ORDER — LANOLIN ALCOHOL/MO/W.PET/CERES
100 CREAM (GRAM) TOPICAL DAILY
Status: DISCONTINUED | OUTPATIENT
Start: 2023-12-13 | End: 2023-12-16 | Stop reason: HOSPADM

## 2023-12-12 RX ORDER — ACETAMINOPHEN 325 MG/1
650 TABLET ORAL EVERY 6 HOURS PRN
Status: CANCELLED | OUTPATIENT
Start: 2023-12-12

## 2023-12-12 RX ORDER — TRAZODONE HYDROCHLORIDE 50 MG/1
50 TABLET ORAL
Status: CANCELLED | OUTPATIENT
Start: 2023-12-12

## 2023-12-12 RX ADMIN — NICOTINE POLACRILEX 2 MG: 2 GUM, CHEWING BUCCAL at 21:38

## 2023-12-12 RX ADMIN — NICOTINE 21 MG: 21 PATCH, EXTENDED RELEASE TRANSDERMAL at 21:38

## 2023-12-12 RX ADMIN — SODIUM CHLORIDE 1000 ML: 0.9 INJECTION, SOLUTION INTRAVENOUS at 21:15

## 2023-12-13 PROBLEM — F11.20 OPIOID USE DISORDER, MODERATE, DEPENDENCE (HCC): Status: ACTIVE | Noted: 2023-12-13

## 2023-12-13 PROBLEM — S93.402A SPRAIN OF LEFT ANKLE: Status: ACTIVE | Noted: 2023-12-13

## 2023-12-13 PROBLEM — F11.93 OPIOID WITHDRAWAL (HCC): Status: ACTIVE | Noted: 2023-12-13

## 2023-12-13 LAB
ATRIAL RATE: 82 BPM
P AXIS: 57 DEGREES
PR INTERVAL: 148 MS
QRS AXIS: 50 DEGREES
QRSD INTERVAL: 88 MS
QT INTERVAL: 388 MS
QTC INTERVAL: 453 MS
T WAVE AXIS: 24 DEGREES
VENTRICULAR RATE: 82 BPM

## 2023-12-13 PROCEDURE — 97760 ORTHOTIC MGMT&TRAING 1ST ENC: CPT

## 2023-12-13 PROCEDURE — 97163 PT EVAL HIGH COMPLEX 45 MIN: CPT

## 2023-12-13 PROCEDURE — 99223 1ST HOSP IP/OBS HIGH 75: CPT | Performed by: EMERGENCY MEDICINE

## 2023-12-13 RX ORDER — BUPRENORPHINE 20 UG/H
20 PATCH TRANSDERMAL
Status: DISCONTINUED | OUTPATIENT
Start: 2023-12-13 | End: 2023-12-15

## 2023-12-13 RX ORDER — LORAZEPAM 2 MG/ML
2 INJECTION INTRAMUSCULAR ONCE AS NEEDED
Status: DISCONTINUED | OUTPATIENT
Start: 2023-12-13 | End: 2023-12-16 | Stop reason: HOSPADM

## 2023-12-13 RX ORDER — LAMOTRIGINE 100 MG/1
200 TABLET ORAL DAILY
Status: DISCONTINUED | OUTPATIENT
Start: 2023-12-13 | End: 2023-12-16 | Stop reason: HOSPADM

## 2023-12-13 RX ORDER — IBUPROFEN 600 MG/1
600 TABLET ORAL EVERY 6 HOURS PRN
Status: DISCONTINUED | OUTPATIENT
Start: 2023-12-13 | End: 2023-12-16 | Stop reason: HOSPADM

## 2023-12-13 RX ADMIN — ACETAMINOPHEN 650 MG: 325 TABLET ORAL at 15:32

## 2023-12-13 RX ADMIN — TRAZODONE HYDROCHLORIDE 50 MG: 50 TABLET ORAL at 01:32

## 2023-12-13 RX ADMIN — ENOXAPARIN SODIUM 40 MG: 40 INJECTION SUBCUTANEOUS at 08:01

## 2023-12-13 RX ADMIN — NICOTINE POLACRILEX 4 MG: 4 GUM, CHEWING BUCCAL at 21:00

## 2023-12-13 RX ADMIN — ACETAMINOPHEN 650 MG: 325 TABLET ORAL at 01:32

## 2023-12-13 RX ADMIN — BUPRENORPHINE 20 MCG: 20 PATCH TRANSDERMAL at 01:32

## 2023-12-13 RX ADMIN — NICOTINE 1 PATCH: 21 PATCH, EXTENDED RELEASE TRANSDERMAL at 08:02

## 2023-12-13 RX ADMIN — LAMOTRIGINE 200 MG: 100 TABLET ORAL at 14:00

## 2023-12-13 RX ADMIN — TRAZODONE HYDROCHLORIDE 50 MG: 50 TABLET ORAL at 21:27

## 2023-12-13 RX ADMIN — MULTIPLE VITAMINS W/ MINERALS TAB 1 TABLET: TAB ORAL at 08:02

## 2023-12-13 RX ADMIN — ONDANSETRON 4 MG: 2 INJECTION INTRAMUSCULAR; INTRAVENOUS at 01:32

## 2023-12-13 RX ADMIN — THIAMINE HCL TAB 100 MG 100 MG: 100 TAB at 08:02

## 2023-12-13 RX ADMIN — FOLIC ACID 1 MG: 1 TABLET ORAL at 08:02

## 2023-12-13 RX ADMIN — IBUPROFEN 600 MG: 600 TABLET ORAL at 07:54

## 2023-12-13 NOTE — ED NOTES
Meghana came in to access the patient - she doesn't think patient would be willing to wait until morning for placement - Dr Jerod Alvarez will attempt to refer to [facility]. If that fails and the patient is willing to stay - Meghana could Lyft in the morning to [facility]. Erendira's phone # [phone].    21:30 - the patient nearly eloped from the ER via ambulance entrance - patient wants to "smoke" - ER staff advised to order some nicorette gum for the patient.     22:00 - patient given phone # from her belongings to call her friend to bring her phone to ER so she has it when transferred to [facility] for detox - PES asked 1:1 aide to assist.

## 2023-12-13 NOTE — SOCIAL WORK
RUDI left voicemail for [facility] to discuss scheduling OP appointments/[facility] funding. Return phone call requested.

## 2023-12-13 NOTE — ED PROVIDER NOTES
History  Chief Complaint   Patient presents with    Detox Evaluation     States she is here for alcohol detox. Drinks 10 shots of Vodka daily. States was dropped off by her mother and her last drink was PTA. States detox over 15 times. Crying. 33 yo female says she is here for detox from alcohol. She denies drug use. Says she is from [facility] and moved here a few months ago. Is staying with a friend. Her fiancee is incarcerated. She was drinking all day with her "mother in law" who drove her here and left. No recent illness or injury but she was seen here a week ago for a cold and sprained her left ankle a few months ago. She denies suicidal thoughts or plan. History provided by:  Patient   used: No    Detox Evaluation  Associated symptoms: no abdominal pain, no headaches, no nausea, no shortness of breath and no vomiting        Prior to Admission Medications   Prescriptions Last Dose Informant Patient Reported? Taking?   lamoTRIgine (LaMICtal) 200 MG tablet   No No   Sig: Take 1 tablet (200 mg total) by mouth daily   lamoTRIgine (LaMICtal) 200 MG tablet   No No   Sig: Take 1 tablet (200 mg total) by mouth daily      Facility-Administered Medications: None       Past Medical History:   Diagnosis Date    ETOH abuse     Psychiatric disorder        Past Surgical History:   Procedure Laterality Date     SECTION         History reviewed. No pertinent family history. I have reviewed and agree with the history as documented.     E-Cigarette/Vaping    E-Cigarette Use Current Every Day User      E-Cigarette/Vaping Substances    Nicotine Yes     Flavoring Yes      Social History     Tobacco Use    Smoking status: Every Day     Packs/day: 0.25     Types: Cigarettes    Smokeless tobacco: Never   Vaping Use    Vaping Use: Every day    Substances: Nicotine, Flavoring   Substance Use Topics    Alcohol use: Yes     Comment: vodka daily 10 shots    Drug use: Not Currently     Types: Marijuana       Review of Systems   Constitutional: Negative. Negative for fever. HENT: Negative. Eyes: Negative. Respiratory:  Positive for cough. Negative for shortness of breath. Cardiovascular: Negative. Negative for chest pain. Gastrointestinal: Negative. Negative for abdominal pain, diarrhea, nausea and vomiting. Genitourinary: Negative. Negative for dysuria and flank pain. Musculoskeletal: Negative. Negative for back pain and myalgias. Ankle sprain   Skin: Negative. Negative for rash. Neurological: Negative. Negative for dizziness and headaches. Hematological:  Does not bruise/bleed easily. Psychiatric/Behavioral: Negative. All other systems reviewed and are negative. Physical Exam  Physical Exam  Vitals and nursing note reviewed. Constitutional:       General: She is not in acute distress. Appearance: She is well-developed. She is not ill-appearing or diaphoretic. Comments: AOB, appears intoxicated   HENT:      Head: Normocephalic and atraumatic. Nose: Nose normal.      Mouth/Throat:      Mouth: Mucous membranes are dry. Eyes:      Pupils: Pupils are equal, round, and reactive to light. Cardiovascular:      Rate and Rhythm: Regular rhythm. Tachycardia present. Heart sounds: Normal heart sounds. No murmur heard. Pulmonary:      Effort: Pulmonary effort is normal. No respiratory distress. Breath sounds: Normal breath sounds. Abdominal:      General: Bowel sounds are normal. There is no distension. Palpations: Abdomen is soft. Tenderness: There is no abdominal tenderness. Musculoskeletal:         General: No deformity. Normal range of motion. Cervical back: Normal range of motion and neck supple. Right lower leg: No edema. Left lower leg: No edema. Skin:     General: Skin is warm and dry. Coloration: Skin is not pale. Findings: No rash. Neurological:      General: No focal deficit present. Mental Status: She is alert. Cranial Nerves: No cranial nerve deficit. Psychiatric:         Mood and Affect: Mood normal.         Behavior: Behavior normal.         Vital Signs  ED Triage Vitals [12/12/23 1941]   Temperature Pulse Respirations Blood Pressure SpO2   98 °F (36.7 °C) (!) 120 20 (!) 158/103 97 %      Temp Source Heart Rate Source Patient Position - Orthostatic VS BP Location FiO2 (%)   Tympanic Monitor Sitting Left arm --      Pain Score       --           Vitals:    12/12/23 1941   BP: (!) 158/103   Pulse: (!) 120   Patient Position - Orthostatic VS: Sitting         Visual Acuity      ED Medications  Medications   nicotine (NICODERM CQ) 21 mg/24 hr TD 24 hr patch 21 mg (21 mg Transdermal Medication Applied 12/12/23 2138)   sodium chloride 0.9 % bolus 1,000 mL (1,000 mL Intravenous New Bag 12/12/23 2115)   nicotine polacrilex (NICORETTE) gum 2 mg (2 mg Oral Given 12/12/23 2138)       Diagnostic Studies  Results Reviewed       Procedure Component Value Units Date/Time    Magnesium [[id number]]     Lab Status: No result Specimen: Blood     Rapid drug screen, urine [[id number]]  (Normal) Collected: 12/12/23 2057    Lab Status: Final result Specimen: Urine, Clean Catch Updated: 12/12/23 2127     Amph/Meth UR Negative     Barbiturate Ur Negative     Benzodiazepine Urine Negative     Cocaine Urine Negative     Methadone Urine Negative     Opiate Urine Negative     PCP Ur Negative     THC Urine Negative     Oxycodone Urine Negative    Narrative:      FOR MEDICAL PURPOSES ONLY. IF CONFIRMATION NEEDED PLEASE CONTACT THE LAB WITHIN 5 DAYS.     Drug Screen Cutoff Levels:  AMPHETAMINE/METHAMPHETAMINES  1000 ng/mL  BARBITURATES     200 ng/mL  BENZODIAZEPINES     200 ng/mL  COCAINE      300 ng/mL  METHADONE      300 ng/mL  OPIATES      300 ng/mL  PHENCYCLIDINE     25 ng/mL  THC       50 ng/mL  OXYCODONE      100 ng/mL    Comprehensive metabolic panel [[id number]] Collected: 12/12/23 2105    Lab Status: Final result Specimen: Blood from Arm, Left Updated: 12/12/23 2126     Sodium 143 mmol/L      Potassium 3.5 mmol/L      Chloride 108 mmol/L      CO2 26 mmol/L      ANION GAP 9 mmol/L      BUN 7 mg/dL      Creatinine 0.66 mg/dL      Glucose 84 mg/dL      Calcium 8.6 mg/dL      AST 34 U/L      ALT 25 U/L      Alkaline Phosphatase 76 U/L      Total Protein 7.0 g/dL      Albumin 4.3 g/dL      Total Bilirubin 0.23 mg/dL      eGFR 118 ml/min/1.73sq m     Narrative:      [facility] (CKD):     Stage 1 with normal or high GFR (GFR > 90 mL/min/1.73 square meters)    Stage 2 Mild CKD (GFR = 60-89 mL/min/1.73 square meters)    Stage 3A Moderate CKD (GFR = 45-59 mL/min/1.73 square meters)    Stage 3B Moderate CKD (GFR = 30-44 mL/min/1.73 square meters)    Stage 4 Severe CKD (GFR = 15-29 mL/min/1.73 square meters)    Stage 5 End Stage CKD (GFR <15 mL/min/1.73 square meters)  Note: GFR calculation is accurate only with a steady state creatinine    Ethanol [[id number]]  (Abnormal) Collected: 12/12/23 2105    Lab Status: Final result Specimen: Blood from Arm, Left Updated: 12/12/23 2126     Ethanol Lvl 343 mg/dL     CBC and differential [[id number]] Collected: 12/12/23 2105    Lab Status: Final result Specimen: Blood from Arm, Left Updated: 12/12/23 2110     WBC 9.48 Thousand/uL      RBC 4.23 Million/uL      Hemoglobin 12.5 g/dL      Hematocrit 37.3 %      MCV 88 fL      MCH 29.6 pg      MCHC 33.5 g/dL      RDW 14.6 %      MPV 9.5 fL      Platelets 560 Thousands/uL      nRBC 0 /100 WBCs      Neutrophils Relative 48 %      Immat GRANS % 1 %      Lymphocytes Relative 44 %      Monocytes Relative 6 %      Eosinophils Relative 0 %      Basophils Relative 1 %      Neutrophils Absolute 4.65 Thousands/µL      Immature Grans Absolute 0.06 Thousand/uL      Lymphocytes Absolute 4.18 Thousands/µL      Monocytes Absolute 0.52 Thousand/µL      Eosinophils Absolute 0.00 Thousand/µL Basophils Absolute 0.07 Thousands/µL     UA (URINE) with reflex to Scope [[id number]] Collected: 12/12/23 2057    Lab Status: Final result Specimen: Urine, Clean Catch Updated: 12/12/23 2105     Color, UA Light Yellow     Clarity, UA Clear     Specific Gravity, UA <=1.005     pH, UA 6.0     Leukocytes, UA Negative     Nitrite, UA Negative     Protein, UA Negative mg/dl      Glucose, UA Negative mg/dl      Ketones, UA Negative mg/dl      Urobilinogen, UA 0.2 E.U./dl      Bilirubin, UA Negative     Occult Blood, UA Negative    POCT pregnancy, urine [[id number]]  (Normal) Resulted: 12/12/23 2057    Lab Status: Final result Updated: 12/12/23 2057     EXT Preg Test, Ur Negative     Control Valid                   No orders to display              Procedures  ECG 12 Lead Documentation Only    Date/Time: 12/12/2023 10:18 PM    Performed by: Bob Thibodeaux MD  Authorized by: Bob Thibodeaux MD    Indications / Diagnosis:  Detox, alcohol abuse  ECG reviewed by me, the ED Provider: yes    Patient location:  ED  Previous ECG:     Previous ECG:  Unavailable  Interpretation:     Interpretation: normal    Quality:     Tracing quality:  Limited by artifact  Rate:     ECG rate:  82    ECG rate assessment: normal    Rhythm:     Rhythm: sinus rhythm    Ectopy:     Ectopy: none    QRS:     QRS axis:  Normal  Conduction:     Conduction: normal    ST segments:     ST segments:  Normal  T waves:     T waves: normal             ED Course                               SBIRT 22yo+      Flowsheet Row Most Recent Value   Initial Alcohol Screen: US AUDIT-C     1. How often do you have a drink containing alcohol? 6 Filed at: 12/12/2023 1943   2. How many drinks containing alcohol do you have on a typical day you are drinking? 6 Filed at: 12/12/2023 1943   Audit-C Score 12 Filed at: 12/12/2023 1943   Full Alcohol Screen: US AUDIT    4. How often during the last year have you found that you were not able to stop drinking once you had started?  -- [Patient came for detox] Filed at: 12/12/2023 1943   SHABNAM: How many times in the past year have you. .. Used an illegal drug or used a prescription medication for non-medical reasons? Never Filed at: 12/12/2023 1943                      Medical Decision Making  2100 - Katelyn Burgess here to see pt. Nurse just eusebio her blood work and results pending. She was noted to be drinking here in ER on arrival and 2 mini bottles of estella vodka were confiscated from her. Pt. Is crying talking to [facility]. Requesting to go out and smoke. Has nicotine patch on and will add nicorette gum. 2130 - [facility] and crisis suggest we start with [facility]. If they can't take her [facility] will try [facility]  for the morning if pt. Is willing to stay here tonight. She would be cleared to leave on her own after 7 am.  2150 - pt. Requesting something to eat. Says she is ok with no smoking policy at detox. 2200- [facility] will accept pt. Pt. Aware of their rules and policies. Amount and/or Complexity of Data Reviewed  Labs: ordered. Risk  OTC drugs. Disposition  Final diagnoses:   Alcohol abuse     Time reflects when diagnosis was documented in both MDM as applicable and the Disposition within this note       Time User Action Codes Description Comment    49/65/9913  7:73 PM Fredy Meier Add [M58.34] Alcohol abuse     12/12/2023 10:07 PM Alicia Ballard Add [F10.20] Alcohol use disorder, severe, dependence [facility])           ED Disposition       ED Disposition   Transfer to Another Facility-In Network    Condition   --    Date/Time   Tue Dec 12, 2023 2219    Crista Hall should be transferred out to 2025 Abhijit Oliva MD Documentation      Charlott Goldberg Most Recent Value   Patient Condition The patient has been stabilized such that within reasonable medical probability, no material deterioration of the patient condition or the condition of the unborn child(cristobal) is likely to result from the transfer Reason for Transfer Level of Care needed not available at this facility   Benefits of Transfer Specialized equipment and/or services available at the receiving facility (Include comment)________________________   Risks of Transfer Potential for delay in receiving treatment, Potential deterioration of medical condition, Loss of IV, Possible worsening of condition or death during transfer, Increased discomfort during transfer   Accepting Physician Dr. Leonarda Morales Name, Beka Alvarenga, [address] [place] [address] Road   Sending MD Dr. Charles Osullivan   Provider Certification General risk, such as traffic hazards, adverse weather conditions, rough terrain or turbulence, possible failure of equipment (including vehicle or aircraft), or consequences of actions of persons outside the control of the transport personnel          RN Documentation      [address] Name, Beka Alvarenga, [address] [place] [address]          Follow-up Information    None         Patient's Medications   Discharge Prescriptions    No medications on file       No discharge procedures on file.     PDMP Review       None            ED Provider  Electronically Signed by             Julian Tineo MD  39/21/16 2491

## 2023-12-13 NOTE — ASSESSMENT & PLAN NOTE
Patient reports history of anxiety, depression, and bipolar disorder   Current home medication: Lamictal 200 mg daily   Reports compliance with current regiment   Denies SI/HI   No continual observation indicated at this time   Continue home medication   Recommend follow up with Psychiatry/PCP outpatient

## 2023-12-13 NOTE — ASSESSMENT & PLAN NOTE
Patient reports chronic opioid use  Last use 12/12 (initially reported 1 pm on 12/12; today states sometime evening of 12/12 before ED presentation)  Currently with butrans patch in place  Close to at least 40 hrs have surpassed since last reported use.   Currently, withdrawal symptoms appear mild  Patient agreeable to proceed with micro-induction at this time: will order subutex 0.5 mg q.2h x 4 doses; if tolerated, proceed with Suboxone 2 mg q.2h x 4 doses  If induction tolerated, place to transition to Suboxone 8 mg BID  Continue to monitor with symptomatic and supportive care

## 2023-12-13 NOTE — PLAN OF CARE
Problem: SUBSTANCE USE/ABUSE  Goal: By discharge, will develop insight into their chemical dependency and sustain motivation to continue in recovery  Description: INTERVENTIONS:  - Attends all daily group sessions and scheduled AA groups  - Actively practices coping skills through participation in the therapeutic community and adherence to program rules  - Reviews and completes assignments from individual treatment plan  - Assist patient development of understanding of their personal cycle of addiction and relapse triggers  Outcome: Progressing  Goal: By discharge, patient will have ongoing treatment plan addressing chemical dependency  Description: INTERVENTIONS:  - Assist patient with resources and/or appointments for ongoing recovery based living  Outcome: Progressing

## 2023-12-13 NOTE — NURSING NOTE
Patient not currently exhibiting signs/symptoms of withdrawal. Will continue to monitor and start SEWS protocol when patient is in active withdrawal.  Discussed with provider.

## 2023-12-13 NOTE — H&P
HISTORY & PHYSICAL EXAM  DEPARTMENT OF MEDICAL TOXICOLOGY  LEVEL 4 MEDICAL DETOX UNIT  Steffanie Jackson 33 y.o. female MRN: [id number]  Unit/Bed#:  DETOX 505-01 Encounter: [id number]      Reason for Admission/Principal Problem: Ethanol withdrawal, Ethanol use disorder  Admitting Provider: Thuy Rodney PA-C  Attending Provider: Rhea Canas*   12/12/2023 11:53 PM        * Alcohol withdrawal syndrome with complication (HCC)  Assessment & Plan  Patient reports daily alcohol use   Last drink 2000 on 12/12  EtOH 343 on initial labs   Patient reports history of withdrawal seizures   Patient currently endorses nausea but attributes to the ambulance ride     Patient initiated on SEWS protocol for symptom triggered phenobarbital therapy   Initial dose of phenobarbital held due to lack of withdrawal symptoms  Symptomatic and supportive management   Thiamine and folic acid supplementation  Seizure precautions  Electrolyte repletion as needed  Pulse oximetry and telemetry monitoring        Alcohol use disorder, severe, dependence (HCC)  Assessment & Plan  Patient reports long history of alcohol use, most recent detox in 2021  Reports relapse in drinking 2-3 months ago after her significant other was incarcerated   Currently endorses drinking 15-20 shots of vodka daily   Last drink 2000 on 12/12  Reports previous treatment with naltrexone- not a candidate to restart due to suboxone      Case management for assistance in discharge planning- patient intoxicated at time of admission, will revisit discharge planning when clinically appropriate   See Alcohol Withdrawal      Opioid withdrawal (HCC)  Assessment & Plan  Patient reports chronic opioid use  Last use 1300 on 12/12  Patient currently endorsing no acute withdrawal symptoms    Initiate 20 mcg/hr Butrans patch   Plan to start suboxone micro-induction 24-48 hours after last use   Symptomatic and supportive management   Continuous pulse oximetry monitoring         Opioid use disorder, moderate, dependence (HCC)  Assessment & Plan  Patient repots previous opioid use treated with suboxone, no longer taking   Currently use half a medicine cap of kratom daily   Last use 1300 on 12/12  Interested in being restarted on suboxone     Case management for assistance in discharge planning   See Opioid Withdrawal      Anxiety and depression  Assessment & Plan  Patient reports history of anxiety, depression, and bipolar disorder   Current home medication: Lamictal 200 mg daily   Reports compliance with current regiment   Denies SI/HI   No continual observation indicated at this time     Continue home medication   Recommend follow up with Psychiatry/PCP outpatient     Sprain of left ankle  Assessment & Plan  Patient reports spraining her left ankle on 12/6 and was evaluated in the ED  Currently in a soft brace, no crutches   Reports mild pain with ambulation and residual swelling     PT consulted   Supportive management with tylenol and ibuprofen  Continue to monitor    Tobacco use disorder  Assessment & Plan  Patient endorses daily tobacco use  Cessation encouraged  NRT ordered                 VTE Prophylaxis: Enoxaparin (Lovenox)  / sequential compression device   Code Status: Full code      Anticipated Length of Stay:  Patient will be admitted on an Inpatient basis with an anticipated length of stay of  2-3  midnights.   Justification for Hospital Stay: Alcohol withdrawal     For any questions or concerns, please Tiger Text the advanced practitioner in the role of [facility]-DETOX-AP On Call      This patient qualifies for Level IV medically managed intensive inpatient services under the criteria set by the American Society of Addiction Medicine, including dimensions 1-3. The patient is in withdrawal (or is intoxicated with high risk of withdrawal), with severe and unstable medical and/or psychiatric (dual diagnosis) problems, requiring requires 24-hour medical and nursing care and the  full resources of a [facility].          SEWS PHENOBARBITAL PROTOCOL FOR ALCOHOL WITHDRAWAL    Admit patient to medical detox unit and continue supportive care and stabilization of acute ethanol withdrawal per medical toxicology/detox treatment pathway. Monitor ethanol withdrawal severity via the Severity of Ethanol Withdrawal Scale (SEWS) Q4 hours and then hourly if/when SEWS > 6. Treat withdrawal per pathway and reassess Q30-60 minutes.           Mild SEWS Score 1-6  Administer medications* (IV or PO; PO preferred):  If initial SEWS score: diazepam 10mg PO/IV x 1 AND phenobarbital 65 mg PO/IV x 1  If repeat SEWS score 1-6: phenobarbital 65 mg PO/IV q1 hour x 5 doses maximum   Reassessment:   SEWS q1 hour after each dose until SEWS 0 x 2 hours  VS q1 hours (until SEWS 0, then q4 hours)  Notify provider for bedside evaluation if 5-dose maximum is reached, RASS of -3 to -5, or SEWS score escalates to moderate or severe.   Moderate SEWS Score 7-12  Administer medications* (IV):  If initial SEWS score: diazepam 10mg IV x 1 AND phenobarbital 260 mg IV x 1  If repeat SEWS score 7-12 or score escalated from mild: phenobarbital 130 mg IV q30 minutes x 5 doses maximum   Reassessment:  SEWS q30 minutes after each dose until SEWS < 7 (then hourly until SEWS 0 x 2 hours)  VS q30 minutes until SEWS < 7 (then hourly until SEWS 0, then q4 hours)  Notify provider for bedside evaluation if 5-dose maximum is reached, RASS of -3 to -5, or SEWS score escalates to severe.   Severe SEWS Score ? 13  Administer medications* (IV):  If initial SEWS score: Diazepam 10 mg IV x 1 AND phenobarbital 650 mg IV piggyback x 1 over 15-30 minutes  If repeat SEWS score ? 13 or score escalated from mild or moderate: phenobarbital 130 mg IV q30 minutes x 5 doses maximum   Reassessment:  SEWS q30 minutes after each dose until SEWS < 7 (then hourly until SEWS 0 x 2 hours)   VS q30 minutes until SEWS < 7 (then hourly until SEWS 0, then q4  hours)  Notify provider for bedside evaluation if 5-dose maximum is reached or RASS of -3 to -5   *Hold medications and notify provider if CNS depression, respirations < 10/min, or RASS of -3 to -5.         Medications to be administered adjunctively if more than 2 grams of phenobarbital is needed for stabilization of withdrawal; require attending approval.   Dexmedetomidine infusion 0.1-1mcg/kg/hr IV infusion, titratable to reduced agitation (Goal: RASS -2)  Ketamine   Acute agitated delirium: 1-2 mg/kg IV or 4-5 mg/kg IM  Refractory withdrawal: 0.1-1mg/kg/hr IV infusion, titratable to reduced agitation (Goal: RASS -2)    Further evaluation, screening and treatment:  Evaluate complete metabolic panel, transaminases, INR, and lipase. Assess hepatic ultrasound for any sign of alcoholic liver disease or cirrhosis, and ultimately refer for further hepatic evaluation and care as/if indicated.    Additional medications for ethanol associated malnutrition:  Thiamine 100 mg IV daily, increase to 500 mg TID for signs/symptoms of Wernicke's Encephalopathy or Wernicke Korsakoff Syndrome   Folic acid 1 mg IV daily   Multivitamin PO daily      Will offer first monthly injection of Naltrexone 380 mg IM, once patient is stabilized, as it has been shown to assist in decreasing cravings for ethanol.     Evaluate and treat for coexisting substance use, such as opioids and nicotine. Discuss risk factors for infectious disease, such as history of intravenous drug abuse, and offer hepatitis and HIV screening if indicated.     Case management consultation to assist with coordination of subsequent treatment after discharge.          Hx and PE limited by: None, patient alert and cooperative     HPI: Steffanie Jakcson is a 33 y.o. year old female with PMHx of anxiety, depression, and bipolar disorder, who presented to the [place] ED requesting detoxification from alcohol.  Patient reports long history of alcohol use with most recent detox in 2021.   Reports relapse in drinking 2-3 months ago when her significant other was incarcerated.  She currently endorses drinking 15-20 shots of vodka daily with last drink 2000 on 12/12.  EtOH 343 on initial labs. She also reports daily kratom use, using half of a medicine cup worth a day with last use 1300 on 12/12.  She was previously on suboxone and is interested in restarting as she reports the only time she has been fully sober was when on suboxone.    Patient was admitted to [facility] detox unit for alcohol and opioid detoxification. She was started on SEWS protocol with symptom triggered phenobarbital along with symptomatic management of withdrawal. She was placed on a Butrans patch 20 mcg/h with plan to start Suboxone micro induction once 24 to 48 hours from last use  She currently endorses no acute withdrawal symptoms outside of nausea form the ambulance ride. Patient received an initial dose of phenobarbital held due to high alcohol level and lack of withdrawal symptoms.          Preferred alcoholic beverage(s): Vodka  Quantity and frequency of alcohol intake: 15/20 shots daily  Use of any ethanol substitutes (toxic alcohols): no  Date/Time of last alcohol intake: 2000 on 12/12  Current signs and symptoms of ethanol withdrawal: denies    No data recorded      Ethanol Withdrawal History  Previous ethanol withdrawal? yes  Prior inpatient treatment for ethanol withdrawal? yes  Prior outpatient treatment for ethanol withdrawal? yes  History of seizures with prior ethanol withdrawal? yes  Prior treatment with naltrexone (Vivitrol)? yes  Current treatment with naltrexone (Vivitrol)? no  Other current treatment for ethanol use disorder? No      Opioids currently used: kratom  Route of use: oral  Quantity/frequency of opioid use: half a medicine cup daily   Date/Time of Last Opioid Use: 1300 on 12/12  Current Signs/Symptoms of Opioid Withdrawal: denies             Methadone & Buprenorphine History  History of prior treatment  for opioid dependence? yes  Currently on Methadone Maintenance? no  History of prior treatment with Suboxone? yes  Currently taking Suboxone? no  History of using Suboxone without having a prescription? no  History of IVDA? no  Co-existing substance use? yes          Co-existing substance use? no    Review of PDMP: yes     Social History     Substance and Sexual Activity   Alcohol Use Yes    Comment: vodka daily 10 shots     Social History     Substance and Sexual Activity   Drug Use Not Currently    Types: Marijuana     Social History     Tobacco Use   Smoking Status Every Day    Current packs/day: 0.25    Types: Cigarettes   Smokeless Tobacco Never       Review of Systems   Constitutional:  Negative for chills, diaphoresis and fever.   HENT:  Negative for congestion and rhinorrhea.    Respiratory:  Negative for cough, chest tightness and shortness of breath.    Cardiovascular:  Negative for chest pain and palpitations.   Gastrointestinal:  Positive for nausea. Negative for abdominal pain, constipation, diarrhea and vomiting.   Genitourinary:  Negative for difficulty urinating.   Musculoskeletal:  Negative for arthralgias, gait problem and myalgias.   Neurological:  Positive for headaches. Negative for dizziness, tremors and seizures.   Psychiatric/Behavioral:  Negative for agitation, hallucinations, self-injury and suicidal ideas. The patient is nervous/anxious.        Historical Information   Past Medical History:   Diagnosis Date    ETOH abuse     Psychiatric disorder      Past Surgical History:   Procedure Laterality Date     SECTION       No family history on file.  Social History   Marital Status: Single   Patient Pre-hospital Living Situation: Stable  Patient Pre-hospital Level of Mobility: Independent  Patient Pre-hospital Diet Restrictions: None    No Known Allergies    Prior to Admission medications    Medication Sig Start Date End Date Taking? Authorizing Provider   lamoTRIgine (LaMICtal) 200 MG  tablet Take 1 tablet (200 mg total) by mouth daily 11/2/23   Chiara Lew PA-C   lamoTRIgine (LaMICtal) 200 MG tablet Take 1 tablet (200 mg total) by mouth daily 11/29/23   Jennifer Marie MD       Current Facility-Administered Medications   Medication Dose Route Frequency    acetaminophen (TYLENOL) tablet 650 mg  650 mg Oral Q6H PRN    enoxaparin (LOVENOX) subcutaneous injection 40 mg  40 mg Subcutaneous Daily    folic acid (FOLVITE) tablet 1 mg  1 mg Oral Daily    ibuprofen (MOTRIN) tablet 600 mg  600 mg Oral Q6H PRN    influenza vaccine, quadrivalent (FLUARIX) IM injection 0.5 mL  0.5 mL Intramuscular Once    LORazepam (ATIVAN) injection 2 mg  2 mg Intravenous Once PRN    multivitamin-minerals (CENTRUM) tablet 1 tablet  1 tablet Oral Daily    nicotine (NICODERM CQ) 21 mg/24 hr TD 24 hr patch 1 patch  1 patch Transdermal Daily    nicotine polacrilex (NICORETTE) gum 4 mg  4 mg Oral Q2H PRN    ondansetron (ZOFRAN) injection 4 mg  4 mg Intravenous Q6H PRN    pneumococcal 20-naomi conj vacc (PREVNAR 20) IM Injection 0.5 mL  0.5 mL Intramuscular Once    thiamine tablet 100 mg  100 mg Oral Daily    transdermal buprenorphine (BUTRANS) 20 mcg/hr TD patch 20 mcg  20 mcg Transdermal Q7 Days    traZODone (DESYREL) tablet 50 mg  50 mg Oral HS PRN       Continuous Infusions:            Objective     No intake or output data in the 24 hours ending 12/13/23 0132    Invasive Devices:   Peripheral IV 12/12/23 Left;Ventral (anterior) Forearm (Active)   Site Assessment WDL;Clean;Dry;Intact 12/12/23 2104   Line Status Blood return noted;Flushed 12/12/23 2104   Dressing Status Clean;Dry;Intact 12/12/23 2104       Vitals   There were no vitals filed for this visit.    Physical Exam  Constitutional:       General: She is not in acute distress.     Appearance: She is not diaphoretic.      Comments: Intoxicated   Eyes:      Comments: Pupils constricted bilaterally   Cardiovascular:      Rate and Rhythm: Normal rate and regular  "rhythm.      Pulses: Normal pulses.      Heart sounds: Normal heart sounds. No murmur heard.     No gallop.   Pulmonary:      Effort: Pulmonary effort is normal. No respiratory distress.      Breath sounds: Normal breath sounds. No wheezing or rales.   Abdominal:      General: Abdomen is flat. Bowel sounds are normal. There is no distension.      Palpations: Abdomen is soft.      Tenderness: There is no abdominal tenderness. There is no guarding.   Musculoskeletal:         General: Normal range of motion.      Cervical back: Normal range of motion.      Comments: Left ankle in soft brace   Skin:     General: Skin is warm and dry.      Capillary Refill: Capillary refill takes less than 2 seconds.   Neurological:      Mental Status: She is alert and oriented to person, place, and time.      Motor: No tremor.   Psychiatric:         Attention and Perception: Attention and perception normal.         Behavior: Behavior is cooperative.         Thought Content: Thought content normal.      Comments: Unable to fully assess due to intoxication         Data:    EKG, Pathology, and Other Studies: I have personally reviewed pertinent reports.      Lab Results:  CBC ETOH     Lab Results   Component Value Date    WBC 9.48 12/12/2023    RBC 4.23 12/12/2023    HGB 12.5 12/12/2023    HCT 37.3 12/12/2023    MCV 88 12/12/2023    MCH 29.6 12/12/2023    MCHC 33.5 12/12/2023    RDW 14.6 12/12/2023     12/12/2023    MPV 9.5 12/12/2023      No results found for: \"LACTICACID\"   CMP UA         Component Value Date/Time    K 3.5 12/12/2023 2105     12/12/2023 2105    CO2 26 12/12/2023 2105    BUN 7 12/12/2023 2105    CREATININE 0.66 12/12/2023 2105         Component Value Date/Time    CALCIUM 8.6 12/12/2023 2105    ALKPHOS 76 12/12/2023 2105    AST 34 12/12/2023 2105    ALT 25 12/12/2023 2105      Lab Results   Component Value Date    CLARITYU Clear 12/12/2023    COLORU Light Yellow 12/12/2023    SPECGRAV <=1.005 12/12/2023    " "PHUR 6.0 12/12/2023    GLUCOSEU Negative 12/12/2023    KETONESU Negative 12/12/2023    BLOODU Negative 12/12/2023    PROTEIN UA Negative 12/12/2023    NITRITE Negative 12/12/2023    BILIRUBINUR Negative 12/12/2023    UROBILINOGEN 0.2 12/12/2023    LEUKOCYTESUR Negative 12/12/2023        Liver Function Test: ASA     Lab Results   Component Value Date    TBILI 0.23 12/12/2023    ALKPHOS 76 12/12/2023    AST 34 12/12/2023    ALT 25 12/12/2023    TP 7.0 12/12/2023    ALB 4.3 12/12/2023      No results found for: \"SALICYLATE\"   Troponin APAP     No results found for: \"TROPONINI\"   No results found for: \"ACTMNPHEN\"   VBG HCG     No results found for: \"PHVEN\", \"CIP4WYJ\", \"PO2VEN\", \"JUG0LZR\", \"BEVEN\", \"C9MBSMUHK\", \"D2JVLXH\"   No results found for: \"HCGQUANT\"   ABG Urine Drug Screen     No results found for: \"PHART\", \"GCV0JCJ\", \"PO2ART\", \"DLX3ZSB\", \"BEART\", \"R8GERLSNN\", \"O2HGB\", \"SOURC\", \"JANA\", \"VTAC\", \"ACRATE\", \"INSPIREDAIR\", \"PEEP\"   Lab Results   Component Value Date    AMPMETHUR Negative 12/12/2023    BARBTUR Negative 12/12/2023    BDZUR Negative 12/12/2023    COCAINEUR Negative 12/12/2023    METHADONEUR Negative 12/12/2023    OPIATEUR Negative 12/12/2023    PCPUR Negative 12/12/2023    THCUR Negative 12/12/2023    OXYCODONEUR Negative 12/12/2023      Lactate INR     No results found for: \"LACTICACID\"   No results found for: \"INR\"   PTT Protime     No results found for: \"PTT\"     No results found for: \"PROTIME\"           Imaging Studies: I have personally reviewed pertinent reports.        Counseling / Coordination of Care  Total floor / unit time spent today 60 minutes. Greater than 50% of total time was spent with the patient and / or family counseling and / or coordination of care.       ** Please Note: This note has been constructed using a voice recognition system. **    "
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
## 2023-12-13 NOTE — ED NOTES
Pt continued asking if she could go out side to smoke. Pt found at nurses station standing with vape. Attempted to redirect patient back to hallway bed. Pt cooperative with moving back to bed but began to vape in [address]. Pt handed vape over to this RN immediatly after. Triage RN found patient that appeared to be taking pills. Pt informed it was not a pill but her tong ring. Pt also drinking in guzman way. Cap to airplane bottle found on ground next to bed. Upon check of belongings, staff found two more additional airplane bottles of vodka still full. Provider aware of events.       Dann Monroy, NAHED  12/12/23 2047

## 2023-12-13 NOTE — ASSESSMENT & PLAN NOTE
Patient repots previous opioid use treated with suboxone, no longer taking   PDMP reviewed: last prescription suboxone 8-2 mg (60 films / 30 days) filled 7/26/2023   Currently use half a medicine cap of kratom daily   Last use 12/12 (initially reported 1 pm on 12/12; today states sometime evening of 12/12 before ED presentation)  Interested in being restarted on suboxone   Mange withdrawal as above   Case management for assistance in discharge planning - interested in outpatient resources on discharge

## 2023-12-13 NOTE — ASSESSMENT & PLAN NOTE
Patient reports spraining her left ankle on 12/6 and was evaluated in the ED  XR left ankle 12/6/2023: No acute osseous abnormality   PT consulted, appreciate recommendations  New lace-up ankle brace provided, recommending outpatient PT  Reports more comfort with new brace, pain has diminished  Brace in place on exam, neurovascularly intact   Supportive management with tylenol and ibuprofen  Continue to monitor

## 2023-12-13 NOTE — ASSESSMENT & PLAN NOTE
Patient reports daily alcohol use; reports history of withdrawal seizures  Last drink 2000 on 12/12  EtOH 343 on initial labs (12/12/2023 2105)  SEWS protocol with symptom-triggered phenobarbital followed for medical management of alcohol withdrawal   Patient has exhibited mild withdrawal and has not required phenobarbital thus far  Currently appears stable overall- VSS, no tremors  Will monitor off SEWS to ensure complete resolution of withdrawal

## 2023-12-13 NOTE — EMTALA/ACUTE CARE TRANSFER
[address] [address] [address]  [address] [address]  Dept: [phone]      EMTALA TRANSFER CONSENT    NAME Perla Latham                                         1990                              MRN [id number]    I have been informed of my rights regarding examination, treatment, and transfer   by Dr. Glen Cobb MD    Benefits: Specialized equipment and/or services available at the receiving facility (Include comment)________________________    Risks: Potential for delay in receiving treatment, Potential deterioration of medical condition, Loss of IV, Possible worsening of condition or death during transfer, Increased discomfort during transfer      Consent for Transfer:  I acknowledge that my medical condition has been evaluated and explained to me by the emergency department physician or other qualified medical person and/or my attending physician, who has recommended that I be transferred to the service of  Accepting Physician: Dr. Carmina Enrique at [address] Name, [address] :  [place], [place]. The above potential benefits of such transfer, the potential risks associated with such transfer, and the probable risks of not being transferred have been explained to me, and I fully understand them. The doctor has explained that, in my case, the benefits of transfer outweigh the risks. I agree to be transferred. I authorize the performance of emergency medical procedures and treatments upon me in both transit and upon arrival at the receiving facility. Additionally, I authorize the release of any and all medical records to the receiving facility and request they be transported with me, if possible. I understand that the safest mode of transportation during a medical emergency is an ambulance and that the Hospital advocates the use of this mode of transport.  Risks of traveling to the receiving facility by car, including absence of medical control, life sustaining equipment, such as oxygen, and medical personnel has been explained to me and I fully understand them. (JAROD CORRECT BOX BELOW)  [  ]  I consent to the stated transfer and to be transported by ambulance/helicopter. [  ]  I consent to the stated transfer, but refuse transportation by ambulance and accept full responsibility for my transportation by car. I understand the risks of non-ambulance transfers and I exonerate the Hospital and its staff from any deterioration in my condition that results from this refusal.    X___________________________________________    DATE  23  TIME________  Signature of patient or legally responsible individual signing on patient behalf           RELATIONSHIP TO PATIENT_________________________          Provider Certification    NAME Denia Culp                                         1990                              MRN [id number]    A medical screening exam was performed on the above named patient. Based on the examination:    Condition Necessitating Transfer The primary encounter diagnosis was Alcohol abuse. A diagnosis of Alcohol use disorder, severe, dependence ([address]) was also pertinent to this visit.     Patient Condition: The patient has been stabilized such that within reasonable medical probability, no material deterioration of the patient condition or the condition of the unborn child(cristobal) is likely to result from the transfer    Reason for Transfer: Level of Care needed not available at this facility    Transfer Requirements: [facility], [address] available and qualified personnel available for treatment as acknowledged by    Agreed to accept transfer and to provide appropriate medical treatment as acknowledged by       Dr. Wen Soliz  Appropriate medical records of the examination and treatment of the patient are provided at the time of transfer   [address] _______  Transfer will be performed by qualified personnel from    and appropriate transfer equipment as required, including the use of necessary and appropriate life support measures. Provider Certification: I have examined the patient and explained the following risks and benefits of being transferred/refusing transfer to the patient/family:  General risk, such as traffic hazards, adverse weather conditions, rough terrain or turbulence, possible failure of equipment (including vehicle or aircraft), or consequences of actions of persons outside the control of the transport personnel      Based on these reasonable risks and benefits to the patient and/or the unborn child(cristobal), and based upon the information available at the time of the patient’s examination, I certify that the medical benefits reasonably to be expected from the provision of appropriate medical treatments at another medical facility outweigh the increasing risks, if any, to the individual’s medical condition, and in the case of labor to the unborn child, from effecting the transfer.     X____________________________________________ DATE 12/12/23        TIME_______      ORIGINAL - SEND TO MEDICAL RECORDS   COPY - SEND WITH PATIENT DURING TRANSFER

## 2023-12-13 NOTE — UTILIZATION REVIEW
Initial Clinical Review    Pt initially presented to [facility] ED. Pt was transferred by EMS to [facility] for its Level IV medically managed intensive inpatient detox unit, not available at [facility].    Admission: Date/Time/Statement:   Admission Orders (From admission, onward)       Ordered        12/12/23 2354  Inpatient Admission  Once                          Orders Placed This Encounter   Procedures    Inpatient Admission     Standing Status:   Standing     Number of Occurrences:   1     Order Specific Question:   Level of Care     Answer:   Med Surg [16]     Order Specific Question:   Estimated length of stay     Answer:   More than 2 Midnights     Order Specific Question:   Certification     Answer:   I certify that inpatient services are medically necessary for this patient for a duration of greater than two midnights. See H&P and MD Progress Notes for additional information about the patient's course of treatment.        Initial Presentation: 33 y.o. female who presented to medical detox. Inpatient admission for evaluation and treatment of alcohol withdrawal syndrome. Presented w/ need for detox from alcohol. Serum ETOH: 343. Reports 15-20 shots of vodka daily, last drink on 12/12 @ 2000. Also reports daily kratom use, last use on 12/12; interested in Suboxone. Has prior rehab treatment for withdrawal. Reports hx of withdrawal seizures. On exam, appears intoxicated, tachycardic, dry mucous membranes. Plan: SEWS monitoring w/ phenobarbital management, PO thiamine/folic acid supplement, IVF, telemetry, continuous pulse ox, continue PTA meds, trend labs, replete electrolytes as needed. Butrans patch 20 mcg/h with plan to start Suboxone micro induction once 24 to 48 hours from last use       Date: 12/13/23       Day 2: On exam, nausea, pupils constricted b/l.  Plan: continue SEWS monitoring w/ phenobarbital management, PO thiamine/folic acid supplement, telemetry,  continuous pulse ox, continue current meds, trend labs, replete electrolytes as needed. Suboxone micro induction when clinically indicated.        Wt Readings from Last 1 Encounters:   12/13/23 74.8 kg (165 lb)     Vital Signs:   Date/Time Temp Pulse Resp BP MAP (mmHg) SpO2 O2 Device   12/13/23 0734 97.8 °F (36.6 °C) 75 18 137/82 100 95 % None (Room air)   12/13/23 0442 98 °F (36.7 °C) 76 18 121/66 84 95 % None (Room air)   12/13/23 0148 99 °F (37.2 °C) 103 20 150/103 Abnormal  118 95 % None (Room air)   12/12/23 2355 -- -- -- -- -- 95 % None (Room air)       Pertinent Labs/Diagnostic Test Results:   Results from last 7 days   Lab Units 12/06/23  1852   SARS-COV-2  Negative     Results from last 7 days   Lab Units 12/12/23  2105   WBC Thousand/uL 9.48   HEMOGLOBIN g/dL 12.5   HEMATOCRIT % 37.3   PLATELETS Thousands/uL 281   NEUTROS ABS Thousands/µL 4.65         Results from last 7 days   Lab Units 12/12/23  2105   SODIUM mmol/L 143   POTASSIUM mmol/L 3.5   CHLORIDE mmol/L 108   CO2 mmol/L 26   ANION GAP mmol/L 9   BUN mg/dL 7   CREATININE mg/dL 0.66   EGFR ml/min/1.73sq m 118   CALCIUM mg/dL 8.6     Results from last 7 days   Lab Units 12/12/23  2105   AST U/L 34   ALT U/L 25   ALK PHOS U/L 76   TOTAL PROTEIN g/dL 7.0   ALBUMIN g/dL 4.3   TOTAL BILIRUBIN mg/dL 0.23         Results from last 7 days   Lab Units 12/12/23  2105   GLUCOSE RANDOM mg/dL 84      Results from last 7 days   Lab Units 12/12/23 2057   CLARITY UA  Clear   COLOR UA  Light Yellow   SPEC GRAV UA  <=1.005   PH UA  6.0   GLUCOSE UA mg/dl Negative   KETONES UA mg/dl Negative   BLOOD UA  Negative   PROTEIN UA mg/dl Negative   NITRITE UA  Negative   BILIRUBIN UA  Negative   UROBILINOGEN UA E.U./dl 0.2   LEUKOCYTES UA  Negative     Results from last 7 days   Lab Units 12/06/23  1852   INFLUENZA A PCR  Negative   INFLUENZA B PCR  Negative   RSV PCR  Negative         Results from last 7 days   Lab Units 12/12/23  1762   AMPH/METH  Negative   BARBITURATE  UR  Negative   BENZODIAZEPINE UR  Negative   COCAINE UR  Negative   METHADONE URINE  Negative   OPIATE UR  Negative   PCP UR  Negative   THC UR  Negative     Results from last 7 days   Lab Units 12/12/23  2105   ETHANOL LVL mg/dL 343*                  Past Medical History:   Diagnosis Date    ETOH abuse     Psychiatric disorder      Present on Admission:   Alcohol use disorder, severe, dependence (HCC)   Alcohol withdrawal syndrome with complication (HCC)   Anxiety and depression   Tobacco use disorder   Opioid use disorder, moderate, dependence (HCC)   Opioid withdrawal (HCC)   Sprain of left ankle      Admitting Diagnosis: Alcohol abuse [F10.10]  Age/Sex: 33 y.o. female  Admission Orders:  Regular Diet. SCDs.  Fall & Seizure Precautions.  SEWS monitoring.  Telemetry & Continuous Pulse Ox.    Scheduled Medications:  enoxaparin, 40 mg, Subcutaneous, Daily  folic acid, 1 mg, Oral, Daily  [START ON 12/14/2023] influenza vaccine, 0.5 mL, Intramuscular, Once  multivitamin-minerals, 1 tablet, Oral, Daily  nicotine, 1 patch, Transdermal, Daily  [START ON 12/14/2023] pneumococcal 20-naomi conj vacc, 0.5 mL, Intramuscular, Once  thiamine, 100 mg, Oral, Daily  transdermal buprenorphine, 20 mcg, Transdermal, Q7 Days    Continuous IV Infusions: none    PRN Meds:  acetaminophen, 650 mg, Oral, Q6H PRN; 12/13 x1  ibuprofen, 600 mg, Oral, Q6H PRN; 12/13 x1  LORazepam, 2 mg, Intravenous, Once PRN  nicotine polacrilex, 4 mg, Oral, Q2H PRN  ondansetron, 4 mg, Intravenous, Q6H PRN; 12/13x1  traZODone, 50 mg, Oral, HS PRN; 12/13 x1      IP CONSULT TO CASE MANAGEMENT    Network Utilization Review Department  ATTENTION: Please call with any questions or concerns to [phone] and carefully listen to the prompts so that you are directed to the right person. All voicemails are confidential.   For Discharge needs, contact Care Management DC Support Team at [phone] opt. 2  Send all requests for admission clinical reviews, approved or  denied determinations and any other requests to dedicated fax number below belonging to the campus where the patient is receiving treatment. List of dedicated fax numbers for the Facilities:  FACILITY NAME UR FAX NUMBER   ADMISSION DENIALS (Administrative/Medical Necessity) [phone]   DISCHARGE SUPPORT TEAM (NETWORK) [phone]   PARENT CHILD HEALTH (Maternity/NICU/Pediatrics) [phone]   [facility] [phone]   [facility] [phone]   [facility] [phone]   [facility] [phone]   [facility] [phone]   [facility] [phone]   [facility] [phone]   [facility] [phone]   [facility] [phone]   [facility] [phone]   [facility] [phone]

## 2023-12-13 NOTE — ASSESSMENT & PLAN NOTE
Patient reports long history of alcohol use, most recent detox in 2021  Reports relapse in drinking 2-3 months ago after her significant other was incarcerated   Currently endorses drinking 15-20 shots of vodka daily   Last drink 2000 on 12/12  Reports previous treatment with naltrexone- not a candidate to restart due to suboxone MAT  Manage withdrawal as above  Continue Thiamine and folic acid supplementation  Case management for assistance in discharge planning- ultimately interested in outpatient resources on discharge

## 2023-12-13 NOTE — PHYSICAL THERAPY NOTE
Physical Therapy Evaluation    Patient's Name: Steffanie Jackson    Admitting Diagnosis  Alcohol abuse [F10.10]    Problem List  Patient Active Problem List   Diagnosis    Alcohol use disorder, severe, dependence (HCC)    Alcohol withdrawal syndrome with complication (HCC)    Tobacco use disorder    Anxiety and depression    Opioid use disorder, moderate, dependence (HCC)    Opioid withdrawal (HCC)    Sprain of left ankle       Past Medical History  Past Medical History:   Diagnosis Date    ETOH abuse     Psychiatric disorder        Past Surgical History  Past Surgical History:   Procedure Laterality Date     SECTION         Recent Imaging  No orders to display       Recent Vital Signs  Vitals:    23 0442 23 0734 23 1111 23 1500   BP: 121/66 137/82 124/73 143/88   BP Location: Left arm Right arm Left arm Left arm   Pulse: 76 75 60 81   Resp: 18 18 18 18   Temp: 98 °F (36.7 °C) 97.8 °F (36.6 °C)  97.6 °F (36.4 °C)   TempSrc: Temporal Temporal  Temporal   SpO2: 95% 95% 96% 96%   Weight:       Height:            23 1540   PT Last Visit   PT Visit Date 23   Note Type   Note type Evaluation;Orthotic Management/Training   Type of Brace   Brace Applied   (lace up ankle support)   Patient Position When Brace Applied Supine   End of Consult   Patient Position at End of Consult Supine;All needs within reach   Nurse Communication Nurse aware of consult, application of brace   Restrictions/Precautions   Weight Bearing Precautions Per Order No   Home Living   Type of Home Apartment   Home Layout Performs ADLs on one level;Able to live on main level with bedroom/bathroom;One level   Bathroom Shower/Tub Tub/shower unit   Bathroom Toilet Standard   Prior Function   Level of [place] Independent with ADLs;Independent with functional mobility   Lives With Spouse   Receives Help From Family   Cognition   Overall Cognitive Status WFL   Arousal/Participation Alert   Orientation Level Oriented  X4   Memory Within functional limits   Following Commands Follows all commands and directions without difficulty   RLE Assessment   RLE Assessment WFL   LLE Assessment   LLE Assessment   (5/5; 3/5 at [facility])   Coordination   Movements are Fluid and Coordinated 0   Coordination and Movement Description decreased gross motor; antalgic   Sensation WFL   Light Touch   RLE Light Touch Grossly intact   LLE Light Touch Grossly intact   Bed Mobility   Supine to Sit 7  Independent   Additional items Increased time required   Sit to Supine 7  Independent   Additional items Increased time required   Transfers   Sit to Stand 7  Independent   Additional items Increased time required   Stand to Sit 7  Independent   Additional items Increased time required   Ambulation/Elevation   Gait pattern Step through pattern;Decreased toe off;Decreased heel strike;Narrow BALJIT;Decreased foot clearance;Improper Weight shift;Decreased L stance   Gait Assistance 7  Independent   Assistive Device None   Distance 150ft   Balance   Static Sitting Fair +   Dynamic Sitting Fair +   Static Standing Fair   Dynamic Standing Fair -   Ambulatory Fair -   Endurance Deficit   Endurance Deficit Yes   Endurance Deficit Description limited due to pain and fatigue   Activity Tolerance   Activity Tolerance Patient limited by pain   Medical Staff Made Aware spoke to CM   Nurse Made Aware spoke to RN   Assessment   Prognosis Good   Problem List Decreased strength;Decreased range of motion;Decreased endurance;Impaired balance;Decreased mobility;Decreased coordination;Pain;Orthopedic restrictions   Barriers to Discharge Inaccessible home environment;Decreased caregiver support   Goals   Patient Goals to have less ankle pain   Discharge Recommendation   Rehab Resource Intensity Level, PT III (Minimum Resource Intensity)   AM-PAC Basic Mobility Inpatient   Turning in Flat Bed Without Bedrails 4   Lying on Back to Sitting on Edge of Flat Bed Without Bedrails 4   Moving  Bed to Chair 4   Standing Up From Chair Using Arms 4   Walk in Room 4   Climb 3-5 Stairs With Railing 4   Basic Mobility Inpatient Raw Score 24   Basic Mobility Standardized Score 57.68   Highest Level Of Mobility   -HL Goal 8: Walk 250 feet or more   -HLM Achieved 8: Walk 250 feet ot more   End of Consult   Patient Position at End of Consult Seated edge of bed;All needs within reach       ASSESSMENT                                                                                                                     Steffanie Jackson is a 33 y.o. female admitted to [facility] on 12/12/2023 for Alcohol withdrawal syndrome with complication (HCC). Pt  has a past medical history of ETOH abuse and Psychiatric disorder.. PT was consulted and pt was seen on 12/13/2023 for mobility assessment and d/c planning.  Impairments limiting pt at this time include decreased ROM, impaired balance, decreased endurance, decreased coordination, new onset of impairment of functional mobility, pain, and decreased strength. Pt is currently functioning at a independent level for bed mobility, independent level for transfers, independent level for ambulation with no assistive device. The patient's AM-PAC Basic Mobility Inpatient Short Form Raw Score is 24. A Raw score of greater than 16 suggests the patient may benefit from discharge to home. Please also refer to the recommendation of the Physical Therapist for safe discharge planning.    Recommendations                                                                                                                DME: None    Discharge Disposition:  Home with Outpatient Physical Therapy       Tanvir Otooel PT, DPT

## 2023-12-14 PROBLEM — L40.9 PSORIASIS: Status: ACTIVE | Noted: 2023-12-14

## 2023-12-14 LAB
ANION GAP SERPL CALCULATED.3IONS-SCNC: 9 MMOL/L
BUN SERPL-MCNC: 11 MG/DL (ref 5–25)
CALCIUM SERPL-MCNC: 8.5 MG/DL (ref 8.4–10.2)
CHLORIDE SERPL-SCNC: 104 MMOL/L (ref 96–108)
CO2 SERPL-SCNC: 24 MMOL/L (ref 21–32)
CREAT SERPL-MCNC: 0.58 MG/DL (ref 0.6–1.3)
GFR SERPL CREATININE-BSD FRML MDRD: 123 ML/MIN/1.73SQ M
GLUCOSE SERPL-MCNC: 104 MG/DL (ref 65–140)
MAGNESIUM SERPL-MCNC: 2.1 MG/DL (ref 1.9–2.7)
POTASSIUM SERPL-SCNC: 4.2 MMOL/L (ref 3.5–5.3)
SODIUM SERPL-SCNC: 137 MMOL/L (ref 135–147)

## 2023-12-14 PROCEDURE — 80048 BASIC METABOLIC PNL TOTAL CA: CPT | Performed by: EMERGENCY MEDICINE

## 2023-12-14 PROCEDURE — 99232 SBSQ HOSP IP/OBS MODERATE 35: CPT | Performed by: PHYSICIAN ASSISTANT

## 2023-12-14 PROCEDURE — 83735 ASSAY OF MAGNESIUM: CPT | Performed by: EMERGENCY MEDICINE

## 2023-12-14 RX ORDER — BUPRENORPHINE AND NALOXONE 8; 2 MG/1; MG/1
8 FILM, SOLUBLE BUCCAL; SUBLINGUAL 2 TIMES DAILY
Status: DISCONTINUED | OUTPATIENT
Start: 2023-12-15 | End: 2023-12-16 | Stop reason: HOSPADM

## 2023-12-14 RX ORDER — BUPRENORPHINE 2 MG/1
0.5 TABLET SUBLINGUAL
Status: COMPLETED | OUTPATIENT
Start: 2023-12-14 | End: 2023-12-14

## 2023-12-14 RX ORDER — BUPRENORPHINE AND NALOXONE 2; .5 MG/1; MG/1
2 FILM, SOLUBLE BUCCAL; SUBLINGUAL
Status: COMPLETED | OUTPATIENT
Start: 2023-12-14 | End: 2023-12-14

## 2023-12-14 RX ADMIN — NICOTINE POLACRILEX 4 MG: 4 GUM, CHEWING BUCCAL at 11:07

## 2023-12-14 RX ADMIN — BUPRENORPHINE AND NALOXONE 2 MG: 2; .5 FILM BUCCAL; SUBLINGUAL at 21:20

## 2023-12-14 RX ADMIN — FOLIC ACID 1 MG: 1 TABLET ORAL at 08:07

## 2023-12-14 RX ADMIN — MULTIPLE VITAMINS W/ MINERALS TAB 1 TABLET: TAB ORAL at 08:07

## 2023-12-14 RX ADMIN — TRAZODONE HYDROCHLORIDE 50 MG: 50 TABLET ORAL at 21:20

## 2023-12-14 RX ADMIN — NICOTINE POLACRILEX 4 MG: 4 GUM, CHEWING BUCCAL at 08:13

## 2023-12-14 RX ADMIN — NICOTINE POLACRILEX 4 MG: 4 GUM, CHEWING BUCCAL at 01:47

## 2023-12-14 RX ADMIN — NICOTINE POLACRILEX 4 MG: 4 GUM, CHEWING BUCCAL at 23:00

## 2023-12-14 RX ADMIN — NICOTINE POLACRILEX 4 MG: 4 GUM, CHEWING BUCCAL at 17:23

## 2023-12-14 RX ADMIN — NICOTINE POLACRILEX 4 MG: 4 GUM, CHEWING BUCCAL at 12:59

## 2023-12-14 RX ADMIN — BUPRENORPHINE AND NALOXONE 2 MG: 2; .5 FILM BUCCAL; SUBLINGUAL at 19:33

## 2023-12-14 RX ADMIN — Medication 0.5 MG: at 12:59

## 2023-12-14 RX ADMIN — IBUPROFEN 600 MG: 600 TABLET ORAL at 07:00

## 2023-12-14 RX ADMIN — NICOTINE POLACRILEX 4 MG: 4 GUM, CHEWING BUCCAL at 19:40

## 2023-12-14 RX ADMIN — NICOTINE POLACRILEX 4 MG: 4 GUM, CHEWING BUCCAL at 21:25

## 2023-12-14 RX ADMIN — NICOTINE 1 PATCH: 21 PATCH, EXTENDED RELEASE TRANSDERMAL at 08:05

## 2023-12-14 RX ADMIN — BUPRENORPHINE AND NALOXONE 2 MG: 2; .5 FILM BUCCAL; SUBLINGUAL at 22:53

## 2023-12-14 RX ADMIN — THIAMINE HCL TAB 100 MG 100 MG: 100 TAB at 08:07

## 2023-12-14 RX ADMIN — Medication 0.5 MG: at 11:15

## 2023-12-14 RX ADMIN — Medication 0.5 MG: at 15:20

## 2023-12-14 RX ADMIN — Medication 0.5 MG: at 09:46

## 2023-12-14 RX ADMIN — ENOXAPARIN SODIUM 40 MG: 40 INJECTION SUBCUTANEOUS at 08:07

## 2023-12-14 RX ADMIN — ONDANSETRON 4 MG: 2 INJECTION INTRAMUSCULAR; INTRAVENOUS at 19:35

## 2023-12-14 RX ADMIN — LAMOTRIGINE 200 MG: 100 TABLET ORAL at 08:07

## 2023-12-14 RX ADMIN — BUPRENORPHINE AND NALOXONE 2 MG: 2; .5 FILM BUCCAL; SUBLINGUAL at 17:22

## 2023-12-14 NOTE — PROGRESS NOTES
[facility]  Progress Note  Name: Steffanie Jackson I  MRN: [id number]  Unit/Bed#: 5T DETOX 505-01 I Date of Admission: 12/12/2023   Date of Service: 12/14/2023 I Hospital Day: 2    MEDICAL DETOX UNIT, LEVEL 4  Department of Medical Toxicology  Reason for Admission/Principal Problem: alcohol and opioid withdrawal   Rounding Provider: Kourtney Schuster PA-C, Rhea Canas*     * Alcohol withdrawal syndrome with complication (HCC)  Assessment & Plan  Patient reports daily alcohol use; reports history of withdrawal seizures  Last drink 2000 on 12/12  EtOH 343 on initial labs (12/12/2023 2105)  SEWS protocol with symptom-triggered phenobarbital followed for medical management of alcohol withdrawal   Patient has exhibited mild withdrawal and has not required phenobarbital thus far  Currently appears stable overall- VSS, no tremors  Will monitor off SEWS to ensure complete resolution of withdrawal     Opioid withdrawal (HCC)  Assessment & Plan  Patient reports chronic opioid use  Last use 12/12 (initially reported 1 pm on 12/12; today states sometime evening of 12/12 before ED presentation)  Currently with butrans patch in place  Close to at least 40 hrs have surpassed since last reported use.   Currently, withdrawal symptoms appear mild  Patient agreeable to proceed with micro-induction at this time: will order subutex 0.5 mg q.2h x 4 doses; if tolerated, proceed with Suboxone 2 mg q.2h x 4 doses  If induction tolerated, place to transition to Suboxone 8 mg BID  Continue to monitor with symptomatic and supportive care     Alcohol use disorder, severe, dependence (HCC)  Assessment & Plan  Patient reports long history of alcohol use, most recent detox in 2021  Reports relapse in drinking 2-3 months ago after her significant other was incarcerated   Currently endorses drinking 15-20 shots of vodka daily   Last drink 2000 on 12/12  Reports previous treatment with naltrexone- not a  candidate to restart due to suboxone MAT  Manage withdrawal as above  Continue Thiamine and folic acid supplementation  Case management for assistance in discharge planning- ultimately interested in outpatient resources on discharge     Psoriasis  Assessment & Plan  Patient reports h/o psoriasis  Notes she uses tacrolimus ointment at home   Not on hospital formulary- can resume on discharge   Will continue supportive care with lotions   Recommend outpatient f/u dermatology     Sprain of left ankle  Assessment & Plan  Patient reports spraining her left ankle on 12/6 and was evaluated in the ED  XR left ankle 12/6/2023: No acute osseous abnormality   PT consulted, appreciate recommendations  New lace-up ankle brace provided, recommending outpatient PT  Reports more comfort with new brace, pain has diminished  Brace in place on exam, neurovascularly intact   Supportive management with tylenol and ibuprofen  Continue to monitor    Opioid use disorder, moderate, dependence (HCC)  Assessment & Plan  Patient repots previous opioid use treated with suboxone, no longer taking   PDMP reviewed: last prescription suboxone 8-2 mg (60 films / 30 days) filled 7/26/2023   Currently use half a medicine cap of kratom daily   Last use 12/12 (initially reported 1 pm on 12/12; today states sometime evening of 12/12 before ED presentation)  Interested in being restarted on suboxone   Mange withdrawal as above   Case management for assistance in discharge planning - interested in outpatient resources on discharge     Anxiety and depression  Assessment & Plan  Patient reports history of anxiety, depression, and bipolar disorder   Current home medication: Lamictal 200 mg daily   Reports compliance with current regiment   Denies SI/HI   No continual observation indicated at this time   Continue home medication   Recommend follow up with Psychiatry/PCP outpatient     Tobacco use disorder  Assessment & Plan  Patient endorses daily tobacco  use  Cessation encouraged  NRT ordered        VTE Pharmacologic Prophylaxis:   Pharmacologic: Enoxaparin (Lovenox)  Mechanical VTE Prophylaxis in Place: yes    Code Status: Level 1 - Full Code    Patient Centered Rounds: I have performed bedside rounds with nursing staff today.    Discussions with Specialists or Other Care Team Provider: Dr. Jose CM    Education and Discussions with Family / Patient: I personally did not discuss patient with family at this time. Discussed current plan with patient, answered all questions to best of my ability.     Time Spent for Care: 20 minutes.  More than 50% of total time spent on counseling and coordination of care as described above.    Current Length of Stay: 2 day(s)    Current Patient Status: Inpatient     Certification Statement: The patient will continue to require additional inpatient hospital stay due to ongoing monitoring/management of alcohol and opioid withdrawal   Discharge Plan: home once medically stable      Total time spent today 20 minutes. Greater than 50% of total time was spent with the patient and / or family counseling and / or coordination of care. A description of the counseling / coordination of care: AUD, OUD, suboxone     Subjective:   Patient seen and examined bedside this morning. Reports that she is feeling okay today. Notes that she has had bad withdrawal in the past and is grateful her symptoms are overall mild this time. Currently endorses palpitations, otherwise denies acute complaints.  Currently denies headaches, lightheadedness/dizziness, coughing/sneezing/congestion/rhinorrhea, chest pain, SOB/dyspnea, abdominal pain, N/V/C/D. Agreeable to proceed with suboxone induction, ultimately wants outpatient f/u.     Objective:     Clinical Opiate Withdrawal Scale  Pulse: 59    SEWS Total Score: 0 (12/14/2023  4:43 AM)      Last 24 Hours Medication List:   Current Facility-Administered Medications   Medication Dose Route Frequency Provider Last  Rate    acetaminophen  650 mg Oral Q6H PRN Kelin Rodney PA-C      buprenorphine  0.5 mg Sublingual Q2H Kourtney DefemilyciscoROLANDO      enoxaparin  40 mg Subcutaneous Daily Kelin Rodney PA-C      folic acid  1 mg Oral Daily Kelin Rodney PA-C      ibuprofen  600 mg Oral Q6H PRN Kelin Rodney PA-C      [START ON 12/15/2023] influenza vaccine  0.5 mL Intramuscular Once Rhea Austin MD      lamoTRIgine  200 mg Oral Daily Kourtney Defemilycietelvina, ROLANDO      LORazepam  2 mg Intravenous Once PRN Kelin Rodney PA-C      multivitamin-minerals  1 tablet Oral Daily Keiln Rodney PA-C      nicotine  1 patch Transdermal Daily Kelin Rodney PA-C      nicotine polacrilex  4 mg Oral Q2H PRN Kelin Rodney PA-C      ondansetron  4 mg Intravenous Q6H PRN Kelin Rodney PA-C      [START ON 12/15/2023] pneumococcal 20-naomi conj vacc  0.5 mL Intramuscular Once Rhea Austin MD      thiamine  100 mg Oral Daily Kelin Rodney PA-C      transdermal buprenorphine  20 mcg Transdermal Q7 Days Kelin Rodney PA-C      traZODone  50 mg Oral HS PRN Kelin Rodney PA-C           Vitals:   Temp (24hrs), Av.6 °F (36.4 °C), Min:97.5 °F (36.4 °C), Max:97.8 °F (36.6 °C)    Temp:  [97.5 °F (36.4 °C)-97.8 °F (36.6 °C)] 97.5 °F (36.4 °C)  HR:  [59-81] 59  Resp:  [16-18] 16  BP: ()/(58-88) 109/68  SpO2:  [94 %-98 %] 96 %  Body mass index is 29.23 kg/m².     Input and Output Summary (last 24 hours):  Intake/Output Summary (Last 24 hours) at 2023 1010  Last data filed at 2023 1711  Gross per 24 hour   Intake 480 ml   Output --   Net 480 ml       Physical Exam:   Physical Exam  Vitals and nursing note reviewed.   Constitutional:       General: She is not in acute distress.     Appearance: She is well-developed.   HENT:      Head: Normocephalic and atraumatic.   Eyes:      Conjunctiva/sclera: Conjunctivae normal.   Cardiovascular:      Rate and Rhythm: Normal rate and regular rhythm.       Pulses: Normal pulses.      Heart sounds: Normal heart sounds. No murmur heard.  Pulmonary:      Effort: Pulmonary effort is normal. No respiratory distress.      Breath sounds: Normal breath sounds. No wheezing, rhonchi or rales.   Abdominal:      General: Abdomen is flat. Bowel sounds are normal. There is no distension.      Palpations: Abdomen is soft.      Tenderness: There is no abdominal tenderness. There is no guarding.   Musculoskeletal:         General: No swelling.      Cervical back: Normal range of motion.      Right lower leg: No edema.      Left lower leg: No edema.      Left ankle: Normal pulse.      Comments: Left ankle currently in lace-up brace    Skin:     General: Skin is warm and dry.      Capillary Refill: Capillary refill takes less than 2 seconds.      Findings: Rash (psoriasis rash noted on arms, face below eyes) present. Rash is scaling.   Neurological:      General: No focal deficit present.      Mental Status: She is alert and oriented to person, place, and time. Mental status is at baseline.   Psychiatric:         Attention and Perception: Attention normal.         Mood and Affect: Mood normal.         Speech: Speech normal.         Behavior: Behavior is cooperative.         Thought Content: Thought content does not include homicidal or suicidal ideation. Thought content does not include homicidal or suicidal plan.         Additional Data:     Labs: keep all most recent labs as listed on admission templates   Results from last 7 days   Lab Units 12/12/23  2105   WBC Thousand/uL 9.48   HEMOGLOBIN g/dL 12.5   HEMATOCRIT % 37.3   PLATELETS Thousands/uL 281   NEUTROS PCT % 48   LYMPHS PCT % 44   MONOS PCT % 6   EOS PCT % 0      Results from last 7 days   Lab Units 12/14/23  0441 12/12/23  2105   SODIUM mmol/L 137 143   POTASSIUM mmol/L 4.2 3.5   CHLORIDE mmol/L 104 108   CO2 mmol/L 24 26   BUN mg/dL 11 7   CREATININE mg/dL 0.58* 0.66   ANION GAP mmol/L 9 9   CALCIUM mg/dL 8.5 8.6    ALBUMIN g/dL  --  4.3   TOTAL BILIRUBIN mg/dL  --  0.23   ALK PHOS U/L  --  76   ALT U/L  --  25   AST U/L  --  34   GLUCOSE RANDOM mg/dL 104 84         * I Have Reviewed All Lab Data Listed Above.  * Additional Pertinent Lab Tests Reviewed: All Labs Within Last 24 Hours Reviewed      Imaging Studies: I have personally reviewed pertinent reports.        Recent Cultures (last 7 days):          Today, Patient Was Seen By: Kourtney Schuster PA-C    ** Please Note: Dictation voice to text software may have been used in the creation of this document. **

## 2023-12-14 NOTE — ASSESSMENT & PLAN NOTE
Patient reports h/o psoriasis  Notes she uses tacrolimus ointment at home   Not on hospital formulary- can resume on discharge   Will continue supportive care with lotions   Recommend outpatient f/u dermatology

## 2023-12-15 PROBLEM — F11.93 OPIOID WITHDRAWAL (HCC): Status: RESOLVED | Noted: 2023-12-13 | Resolved: 2023-12-15

## 2023-12-15 PROBLEM — F10.939 ALCOHOL WITHDRAWAL SYNDROME WITH COMPLICATION (HCC): Status: RESOLVED | Noted: 2023-12-12 | Resolved: 2023-12-15

## 2023-12-15 PROCEDURE — 99232 SBSQ HOSP IP/OBS MODERATE 35: CPT

## 2023-12-15 RX ORDER — LANOLIN ALCOHOL/MO/W.PET/CERES
6 CREAM (GRAM) TOPICAL
Status: DISCONTINUED | OUTPATIENT
Start: 2023-12-15 | End: 2023-12-16 | Stop reason: HOSPADM

## 2023-12-15 RX ORDER — BUPRENORPHINE AND NALOXONE 8; 2 MG/1; MG/1
8 FILM, SOLUBLE BUCCAL; SUBLINGUAL 2 TIMES DAILY
Qty: 60 FILM | Refills: 0 | Status: SHIPPED | OUTPATIENT
Start: 2023-12-15 | End: 2024-01-14

## 2023-12-15 RX ORDER — HYDROXYZINE HYDROCHLORIDE 25 MG/1
25 TABLET, FILM COATED ORAL EVERY 6 HOURS PRN
Status: DISCONTINUED | OUTPATIENT
Start: 2023-12-15 | End: 2023-12-16 | Stop reason: HOSPADM

## 2023-12-15 RX ADMIN — NICOTINE POLACRILEX 4 MG: 4 GUM, CHEWING BUCCAL at 09:37

## 2023-12-15 RX ADMIN — THIAMINE HCL TAB 100 MG 100 MG: 100 TAB at 09:29

## 2023-12-15 RX ADMIN — FOLIC ACID 1 MG: 1 TABLET ORAL at 09:29

## 2023-12-15 RX ADMIN — BUPRENORPHINE AND NALOXONE 8 MG: 8; 2 FILM BUCCAL; SUBLINGUAL at 20:16

## 2023-12-15 RX ADMIN — MULTIPLE VITAMINS W/ MINERALS TAB 1 TABLET: TAB ORAL at 09:29

## 2023-12-15 RX ADMIN — NICOTINE 1 PATCH: 21 PATCH, EXTENDED RELEASE TRANSDERMAL at 09:31

## 2023-12-15 RX ADMIN — NICOTINE POLACRILEX 4 MG: 4 GUM, CHEWING BUCCAL at 02:05

## 2023-12-15 RX ADMIN — ENOXAPARIN SODIUM 40 MG: 40 INJECTION SUBCUTANEOUS at 09:37

## 2023-12-15 RX ADMIN — NICOTINE POLACRILEX 4 MG: 4 GUM, CHEWING BUCCAL at 17:00

## 2023-12-15 RX ADMIN — NICOTINE POLACRILEX 4 MG: 4 GUM, CHEWING BUCCAL at 11:20

## 2023-12-15 RX ADMIN — ONDANSETRON 4 MG: 2 INJECTION INTRAMUSCULAR; INTRAVENOUS at 17:36

## 2023-12-15 RX ADMIN — LAMOTRIGINE 200 MG: 100 TABLET ORAL at 09:29

## 2023-12-15 RX ADMIN — NICOTINE POLACRILEX 4 MG: 4 GUM, CHEWING BUCCAL at 22:04

## 2023-12-15 RX ADMIN — ONDANSETRON 4 MG: 2 INJECTION INTRAMUSCULAR; INTRAVENOUS at 11:20

## 2023-12-15 RX ADMIN — TRAZODONE HYDROCHLORIDE 50 MG: 50 TABLET ORAL at 22:04

## 2023-12-15 RX ADMIN — ACETAMINOPHEN 650 MG: 325 TABLET ORAL at 20:16

## 2023-12-15 RX ADMIN — MELATONIN TAB 3 MG 6 MG: 3 TAB at 00:29

## 2023-12-15 RX ADMIN — BUPRENORPHINE AND NALOXONE 8 MG: 8; 2 FILM BUCCAL; SUBLINGUAL at 09:29

## 2023-12-15 NOTE — NURSING NOTE
"Patient reports being nauseous. Patient given Zofran. Suggested to patient to wait 4- or so before having her nicorette gum or food. She states \"I want my gum and food.\"   "

## 2023-12-15 NOTE — ASSESSMENT & PLAN NOTE
Patient reports chronic opioid use  Last use 12/12 (initially reported 1 pm on 12/12; today states sometime evening of 12/12 before ED presentation)  Suboxone maintenance dosing starting this AM  Patient reports continued withdrawal symptoms of nausea and chills. Agreed to be monitored today for if needed additional 8mg of suboxone.   Continue to monitor with symptomatic and supportive care

## 2023-12-15 NOTE — DISCHARGE INSTR - OTHER ORDERS
You are being discharged to: [address] [place] [address] tel# [phone]     If you are unable to deal with your distressed mood alone please contact [facility] Crisis #   [phone], dial [phone] or go to the nearest emergency center.     CRISIS INFORMATION  [facility]  Community Carelinks Program  [phone]  [facility]  Outpatient mental health, sexual abuse treatment, and suicide prevention treatment  [phone]  [facility]  Family Crisis Intervention Unit  Crisis Hotline [phone]    DRUG AND ALCOHOL RESOURCES  Community Prevention Resources of [facility]*  Education/Prevention and Recovery Support  [phone]  [facility]*  Outpatient, Intensive Outpatient Services, and [facility] Based Youth Services  [phone]  [facility]*  Outpatient, Intensive Outpatient Services, and Recovery House ([facility])  [phone]  [facility]*  Withdrawal Management, Short-term Residential, Short-term with Co-occurring Disorders  [phone]  Parent to Parent*  Recovery Support Services  [phone]  [facility]*  Medication Assisted Treatment  [phone]

## 2023-12-15 NOTE — ASSESSMENT & PLAN NOTE
Patient repots previous opioid use treated with suboxone, no longer taking   PDMP reviewed: last prescription suboxone 8-2 mg (60 films / 30 days) filled 7/26/2023   Currently use half a medicine cap of kratom daily   Last use 12/12 (initially reported 1 pm on 12/12; today states sometime evening of 12/12 before ED presentation)  On maintenance suboxone  Mange withdrawal as above   Case management for assistance in discharge planning - interested in outpatient resources on discharge

## 2023-12-15 NOTE — PROGRESS NOTES
Progress Note - Medical Toxicology    Steffanie Jackson 33 y.o. female MRN: [id number]  Unit/Bed#: 5T DETOX 505-01 Encounter: [id number]  MEDICAL DETOX UNIT, LEVEL 4  Department of Medical Toxicology  Reason for Admission/Principal Problem: opioid withdrawal   Rounding Provider: Anahi Quick PA-C, Laura Juan MD         Opioid withdrawal (HCC)  Assessment & Plan  Patient reports chronic opioid use  Last use 12/12 (initially reported 1 pm on 12/12; today states sometime evening of 12/12 before ED presentation)  Suboxone maintenance dosing starting this AM  Patient reports continued withdrawal symptoms of nausea and chills. Agreed to be monitored today for if needed additional 8mg of suboxone.   Continue to monitor with symptomatic and supportive care     Opioid use disorder, moderate, dependence (HCC)  Assessment & Plan  Patient repots previous opioid use treated with suboxone, no longer taking   PDMP reviewed: last prescription suboxone 8-2 mg (60 films / 30 days) filled 7/26/2023   Currently use half a medicine cap of kratom daily   Last use 12/12 (initially reported 1 pm on 12/12; today states sometime evening of 12/12 before ED presentation)  On maintenance suboxone  Mange withdrawal as above   Case management for assistance in discharge planning - interested in outpatient resources on discharge     Psoriasis  Assessment & Plan  Patient reports h/o psoriasis  Notes she uses tacrolimus ointment at home   Not on hospital formulary- can resume on discharge   Will continue supportive care with lotions   Recommend outpatient f/u dermatology     Sprain of left ankle  Assessment & Plan  Patient reports spraining her left ankle on 12/6 and was evaluated in the ED  XR left ankle 12/6/2023: No acute osseous abnormality   PT consulted, appreciate recommendations  New lace-up ankle brace provided, recommending outpatient PT  Reports more comfort with new brace, pain has diminished  Brace in place on exam,  neurovascularly intact   Supportive management with tylenol and ibuprofen    Anxiety and depression  Assessment & Plan  Patient reports history of anxiety, depression, and bipolar disorder   Current home medication: Lamictal 200 mg daily   Reports compliance with current regiment   Denies SI/HI   No continual observation indicated at this time   Continue home medication   Recommend follow up with Psychiatry/PCP outpatient     Alcohol use disorder, severe, dependence (HCC)  Assessment & Plan  Patient reports long history of alcohol use, most recent detox in 2021  Reports relapse in drinking 2-3 months ago after her significant other was incarcerated   Currently endorses drinking 15-20 shots of vodka daily   Last drink 2000 on 12/12  Reports previous treatment with naltrexone- not a candidate to restart due to suboxone MAT  Manage withdrawal as above  Continue Thiamine and folic acid supplementation  Case management for assistance in discharge planning- ultimately interested in outpatient resources on discharge             VTE Pharmacologic Prophylaxis:   Pharmacologic: Enoxaparin (Lovenox)  Mechanical VTE Prophylaxis in Place: no    Code Status: Level 1 - Full Code    Patient Centered Rounds: I have performed bedside rounds with nursing staff today.    Discussions with Specialists or Other Care Team Provider: Case Management, Nursing   Education and Discussions with Family / Patient: I personally answered all of the patient's questions,     Time Spent for Care: 30 minutes.  More than 50% of total time spent on counseling and coordination of care as described above.    Current Length of Stay: 3 day(s)    Current Patient Status: Inpatient     Certification Statement: The patient will continue to require additional inpatient hospital stay due to opioid withdrawal  Discharge Plan: Anticipated Discharge within 24 hours           Subjective:   Patient seen and examined at bedside. Continues to report withdrawal symptoms  despite micro-induction agreeable to stay and continue with comfort medication.     Objective:     Clinical Opiate Withdrawal Scale  Pulse: 80    SEWS Total Score: 0 (2023  4:43 AM)        Last 24 Hours Medication List:   Current Facility-Administered Medications   Medication Dose Route Frequency Provider Last Rate    acetaminophen  650 mg Oral Q6H PRN Kelin Rodney PA-C      buprenorphine-naloxone  8 mg Sublingual BID Kourtney DefranciscoROLANDO      enoxaparin  40 mg Subcutaneous Daily Kelin Rodney PA-C      folic acid  1 mg Oral Daily Kelin Rodney PA-C      ibuprofen  600 mg Oral Q6H PRN Kelin Rodney PA-C      influenza vaccine  0.5 mL Intramuscular Once Rhea Austin MD      lamoTRIgine  200 mg Oral Daily Kourtney ROLANDO Schuster      LORazepam  2 mg Intravenous Once PRN Kelin Rodney PA-C      melatonin  6 mg Oral HS PRN Kelin Rodeny PA-C      multivitamin-minerals  1 tablet Oral Daily Kelin Rodney PA-C      nicotine  1 patch Transdermal Daily Kelin Rodney PA-C      nicotine polacrilex  4 mg Oral Q2H PRN Kelin Rodney PA-C      ondansetron  4 mg Intravenous Q6H PRN Kelin Rodney PA-C      pneumococcal 20-naomi conj vacc  0.5 mL Intramuscular Once Rhea Austin MD      thiamine  100 mg Oral Daily Kelin Rodney PA-C      transdermal buprenorphine  20 mcg Transdermal Q7 Days Kelin Rodney PA-C      traZODone  50 mg Oral HS PRN Kelin Rodney PA-C           Vitals:   Temp (24hrs), Av.4 °F (36.3 °C), Min:97 °F (36.1 °C), Max:97.8 °F (36.6 °C)    Temp:  [97 °F (36.1 °C)-97.8 °F (36.6 °C)] 97.8 °F (36.6 °C)  HR:  [63-99] 80  Resp:  [16-18] 18  BP: (117-160)/(69-98) 126/82  SpO2:  [94 %-97 %] 97 %  Body mass index is 29.23 kg/m².     Input and Output Summary (last 24 hours):No intake or output data in the 24 hours ending 12/15/23 0906    Physical Exam:   Physical Exam  Vitals and nursing note reviewed.   Constitutional:       General: She is  not in acute distress.     Appearance: She is not diaphoretic.   HENT:      Mouth/Throat:      Mouth: Mucous membranes are moist.   Eyes:      General: No scleral icterus.     Pupils: Pupils are equal, round, and reactive to light.   Cardiovascular:      Rate and Rhythm: Normal rate and regular rhythm.      Heart sounds: No murmur heard.  Pulmonary:      Effort: No respiratory distress.      Breath sounds: Normal breath sounds.   Neurological:      Mental Status: She is alert and oriented to person, place, and time.   Psychiatric:         Mood and Affect: Mood is anxious. Mood is not depressed.         Additional Data:     Labs: keep all most recent labs as listed on admission templates   Results from last 7 days   Lab Units 12/12/23  2105   WBC Thousand/uL 9.48   HEMOGLOBIN g/dL 12.5   HEMATOCRIT % 37.3   PLATELETS Thousands/uL 281   NEUTROS PCT % 48   LYMPHS PCT % 44   MONOS PCT % 6   EOS PCT % 0      Results from last 7 days   Lab Units 12/14/23  0441 12/12/23  2105   SODIUM mmol/L 137 143   POTASSIUM mmol/L 4.2 3.5   CHLORIDE mmol/L 104 108   CO2 mmol/L 24 26   BUN mg/dL 11 7   CREATININE mg/dL 0.58* 0.66   ANION GAP mmol/L 9 9   CALCIUM mg/dL 8.5 8.6   ALBUMIN g/dL  --  4.3   TOTAL BILIRUBIN mg/dL  --  0.23   ALK PHOS U/L  --  76   ALT U/L  --  25   AST U/L  --  34   GLUCOSE RANDOM mg/dL 104 84                              * I Have Reviewed All Lab Data Listed Above.  * Additional Pertinent Lab Tests Reviewed: All Labs For Current Hospital Admission Reviewed      Imaging Studies: I have personally reviewed pertinent reports.        Recent Cultures (last 7 days):          Today, Patient Was Seen By: Anahi Quick PA-C    ** Please Note: Dictation voice to text software may have been used in the creation of this document. **

## 2023-12-15 NOTE — PLAN OF CARE
Problem: SUBSTANCE USE/ABUSE  Goal: By discharge, will develop insight into their chemical dependency and sustain motivation to continue in recovery  Description: INTERVENTIONS:  - Attends all daily group sessions and scheduled AA groups  - Actively practices coping skills through participation in the therapeutic community and adherence to program rules  - Reviews and completes assignments from individual treatment plan  - Assist patient development of understanding of their personal cycle of addiction and relapse triggers  Outcome: Progressing     Problem: SUBSTANCE USE/ABUSE  Goal: By discharge, patient will have ongoing treatment plan addressing chemical dependency  Description: INTERVENTIONS:  - Assist patient with resources and/or appointments for ongoing recovery based living  Outcome: Progressing

## 2023-12-15 NOTE — CASE MANAGEMENT
CM met with pt, reviewed plan for d/c this weekend. Pt reports will need transport via [facility]. Pt in agreement with outpatient treatment only with [facility]. Pt reports having complete [place] application but not approved yet. Pt signed VENUS for [facility] and [facility] for funding.    Call made to  [facility], tel# [phone], lvm requesting c/b for [facility] funding for outpatient alcohol treatment through [facility].     Call made to [facility] intake tel# [phone], spoke with Kirsten; pt present for call. Pt participated in intake process. Referral for IOP alcohol treatment including MAT for Suboxone maintenance. Pt to be contacted on Monday with intake appt for IOP services. DC paperwork to be faxed to [facility] fax# [phone].

## 2023-12-15 NOTE — ASSESSMENT & PLAN NOTE
Patient reports spraining her left ankle on 12/6 and was evaluated in the ED  XR left ankle 12/6/2023: No acute osseous abnormality   PT consulted, appreciate recommendations  New lace-up ankle brace provided, recommending outpatient PT  Reports more comfort with new brace, pain has diminished  Brace in place on exam, neurovascularly intact   Supportive management with tylenol and ibuprofen

## 2023-12-15 NOTE — DISCHARGE SUMMARY
MEDICAL DETOX UNIT, LEVEL 4  Department of Medical Toxicology  Reason for Admission/Principal Problem: Alcohol and opioid withdrawal   Admitting provider: ROLANDO León MD   12/12/2023 11:53 PM       Discharging Physician / Practitioner: Anahi Quick PA-C  PCP: No primary care provider on file.  Admission Date:   Admission Orders (From admission, onward)       Ordered        12/12/23 2354  Inpatient Admission  Once                          Discharge Date: 12/16/23    Medical Problems       Resolved Problems  Date Reviewed: 12/14/2023            Resolved    * (Principal) Alcohol withdrawal syndrome with complication (HCC) 12/15/2023     Resolved by  Kelin Rodney PA-C          Opioid withdrawal (HCC)  Assessment & Plan  Patient reports chronic opioid use  Last use 12/12 (initially reported 1 pm on 12/12; today states sometime evening of 12/12 before ED presentation)  Denies further withdrawal symptoms    Alcohol use disorder, severe, dependence (HCC)  Assessment & Plan  Patient reports long history of alcohol use, most recent detox in 2021  Reports relapse in drinking 2-3 months ago after her significant other was incarcerated   Currently endorses drinking 15-20 shots of vodka daily   Last drink 2000 on 12/12  Reports previous treatment with naltrexone- not a candidate to restart due to suboxone MAT  Manage withdrawal as above  Continue Thiamine and folic acid supplementation  Case management for assistance in discharge planning- ultimately interested in outpatient resources on discharge     Opioid use disorder, moderate, dependence (HCC)  Assessment & Plan  Patient repots previous opioid use treated with suboxone, no longer taking   PDMP reviewed: last prescription suboxone 8-2 mg (60 films / 30 days) filled 7/26/2023   Currently use half a medicine cap of kratom daily   Last use 12/12 (initially reported 1 pm on 12/12; today states sometime evening of 12/12 before ED  presentation)  On maintenance suboxone  Mange withdrawal as above   Case management for assistance in discharge planning - interested in outpatient resources on discharge     Psoriasis  Assessment & Plan  Patient reports h/o psoriasis  Notes she uses tacrolimus ointment at home   Not on hospital formulary- can resume on discharge   Will continue supportive care with lotions   Recommend outpatient f/u dermatology     Sprain of left ankle  Assessment & Plan  Patient reports spraining her left ankle on 12/6 and was evaluated in the ED  XR left ankle 12/6/2023: No acute osseous abnormality   PT consulted, appreciate recommendations  New lace-up ankle brace provided, recommending outpatient PT  Reports more comfort with new brace, pain has diminished  Brace in place on exam, neurovascularly intact   Supportive management with tylenol and ibuprofen    Anxiety and depression  Assessment & Plan  Patient reports history of anxiety, depression, and bipolar disorder   Current home medication: Lamictal 200 mg daily   Reports compliance with current regiment   Denies SI/HI   No continual observation indicated at this time   Continue home medication   Recommend follow up with Psychiatry/PCP outpatient     Tobacco use disorder  Assessment & Plan  Patient endorses daily tobacco use  Cessation encouraged  NRT ordered        Consultations During Hospital Stay:  Case Management   Physical Therapy     Procedures Performed:   Suboxone induction     Significant Findings / Test Results:   None    Incidental Findings:   None     Test Results Pending at Discharge (will require follow up):   None     Outpatient Tests Requested:  PCP follow up   Psychiatry follow up     Complications:  None    Reason for Admission: Alcohol and opioid withdrawal     Hospital Course:     Steffanie Jackson is a 33 y.o. female patient who originally presented to the hospital on 12/12/2023 due to alcohol and opioid withdrawal. [place] presented to the  [place]  "ED requesting detoxification from alcohol. Patient was admitted to [facility] for medically assisted alcohol withdrawal and did not require phenobarbital administration due to lack of withdrawal symptoms.  On admission, she was placed on Butrans patch 20 mcg/hr to slowly introduce Suboxone and prevent precipitated withdrawal.  On 12/14/23, patient underwent microinduction, which she tolerated well.  Pt was then stabilized on maintenance Suboxone 8 mg BID without complication, and Butrans patch subsequently removed.  Case management was consulted for assistance with aftercare resources, and patient will be discharged to home with outpatient follow up. Patient to be contacted on Monday with outpatient MAT appointment.         Please see above list of diagnoses and related plan for additional information.     Condition at Discharge: good     Discharge Day Visit / Exam:     Subjective:  Patient seen at bedside denies further withdrawal symptoms. She is scheduled for intake appointment on Monday.     Vitals: Blood Pressure: 110/61 (12/16/23 0718)  Pulse: 79 (12/16/23 0718)  Temperature: (!) 97 °F (36.1 °C) (12/16/23 0718)  Temp Source: Temporal (12/16/23 0718)  Respirations: 16 (12/16/23 0718)  Height: 5' 3\" (160 cm) (12/13/23 0148)  Weight - Scale: 74.8 kg (165 lb) (12/13/23 0148)  SpO2: 93 % (12/16/23 0718)  Exam:   Physical Exam  Vitals and nursing note reviewed.   Constitutional:       General: She is not in acute distress.     Appearance: She is not diaphoretic.   Eyes:      General: No scleral icterus.     Pupils: Pupils are equal, round, and reactive to light.   Cardiovascular:      Rate and Rhythm: Normal rate and regular rhythm.      Heart sounds: No murmur heard.  Pulmonary:      Effort: No respiratory distress.      Breath sounds: Normal breath sounds.   Abdominal:      General: Bowel sounds are normal. There is no distension.      Palpations: Abdomen is soft. "   Neurological:      Mental Status: She is alert and oriented to person, place, and time.   Psychiatric:         Attention and Perception: Attention and perception normal. She is attentive.         Mood and Affect: Mood is not anxious or depressed.       Discussion with Family: Spoke to patient regarding treatment plan    Discharge instructions/Information to patient and family:   See after visit summary for information provided to patient and family.      Provisions for Follow-Up Care:  See after visit summary for information related to follow-up care and any pertinent home health orders.      Disposition:     Home    For Discharges to [facility]:   Not Applicable to this Patient - Not Applicable to this Patient    Planned Readmission: None     Discharge Statement:  I spent 40 minutes discharging the patient. This time was spent on the day of discharge. I had direct contact with the patient on the day of discharge. Greater than 50% of the total time was spent examining patient, answering all patient questions, arranging and discussing plan of care with patient as well as directly providing post-discharge instructions.  Additional time then spent on discharge activities.    Discharge Medications:  See after visit summary for reconciled discharge medications provided to patient and family.      ** Please Note: This note has been constructed using a voice recognition system **       home

## 2023-12-16 VITALS
RESPIRATION RATE: 16 BRPM | BODY MASS INDEX: 29.23 KG/M2 | OXYGEN SATURATION: 93 % | TEMPERATURE: 97 F | DIASTOLIC BLOOD PRESSURE: 61 MMHG | HEART RATE: 79 BPM | WEIGHT: 165 LBS | HEIGHT: 63 IN | SYSTOLIC BLOOD PRESSURE: 110 MMHG

## 2023-12-16 PROCEDURE — 99239 HOSP IP/OBS DSCHRG MGMT >30: CPT

## 2023-12-16 RX ADMIN — NICOTINE 1 PATCH: 21 PATCH, EXTENDED RELEASE TRANSDERMAL at 08:12

## 2023-12-16 RX ADMIN — LAMOTRIGINE 200 MG: 100 TABLET ORAL at 08:08

## 2023-12-16 RX ADMIN — NICOTINE POLACRILEX 4 MG: 4 GUM, CHEWING BUCCAL at 10:01

## 2023-12-16 RX ADMIN — BUPRENORPHINE AND NALOXONE 8 MG: 8; 2 FILM BUCCAL; SUBLINGUAL at 08:08

## 2023-12-16 RX ADMIN — NICOTINE POLACRILEX 4 MG: 4 GUM, CHEWING BUCCAL at 07:16

## 2023-12-16 RX ADMIN — FOLIC ACID 1 MG: 1 TABLET ORAL at 08:08

## 2023-12-16 RX ADMIN — NICOTINE POLACRILEX 4 MG: 4 GUM, CHEWING BUCCAL at 12:34

## 2023-12-16 RX ADMIN — MULTIPLE VITAMINS W/ MINERALS TAB 1 TABLET: TAB ORAL at 08:08

## 2023-12-16 RX ADMIN — THIAMINE HCL TAB 100 MG 100 MG: 100 TAB at 08:08

## 2023-12-16 RX ADMIN — NICOTINE POLACRILEX 4 MG: 4 GUM, CHEWING BUCCAL at 01:06

## 2023-12-16 NOTE — PLAN OF CARE
Problem: SUBSTANCE USE/ABUSE  Goal: By discharge, patient will have ongoing treatment plan addressing chemical dependency  Description: INTERVENTIONS:  - Assist patient with resources and/or appointments for ongoing recovery based living  12/16/2023 1336 by Wilberto Burnett RN  Outcome: Adequate for Discharge  12/16/2023 0916 by Wilberto Burnett RN  Outcome: Progressing

## 2023-12-16 NOTE — CASE MANAGEMENT
"CM met with pt to check in about dc plans for today. Pt reported feeling improved and ready to dc home today. Pt confirmed that she will need a ride to her home at [address] [place] [address]. CM will arrange after lunch.     Pt asked about her medications due to not having insurance and that her [place] Medicaid is pending. Pt reported she has a friend that can help her pay for it and take her to her [facility] pharmacy. CM informed pt that  will print GoodRx coupons for her to take to [facility] to see if they help lower the price of her medications. Pt verbalized understanding and thanked CM for help.     Pt also asked for [place] medicaid phone number for her to follow up when she is discharged. Pt reported she applied for Medicaid back in August and reported \"I think I sent all of the things I needed to them.\" Pt reported she will follow up after dc as she needs the insurance for her and her son. CM will add number to her dc summary for her to call when she returns home.   "

## 2023-12-16 NOTE — CASE MANAGEMENT
CM printed [employer] coupons for pt's Suboxone and Lamictal for her to bring to [facility] pharmacy when she is discharged. Pt verbalized understanding that she can provide them to pharmacy as well as use [employer] website to find coupons for her medications. Pt confirmed that her friend can provide her with the money to pay for her prescriptions. MARLA will schedule [employer] transportation this afternoon.

## 2023-12-16 NOTE — NURSING NOTE
Discharge instructions given both written and verbally with details regarding f/u apts and medications. Explained where the prescription was sent and when the next dose due. Suggested to use the [employer] joelle to review lab work and tests. Denies any questions. IV d/c with catheter intact. Dressing in street clothes. Arranging lyft ride to home.

## 2023-12-16 NOTE — CASE MANAGEMENT
CM was informed that pt is ready to dc and CM scheduled pt transportation via [employer] to her home at [address] [place] [address].     LYFT scheduled for 1:15pm     Pt provided with a return to work note, pt requested returning on Monday 12/18/23.   Copy sent to be scanned.

## 2023-12-16 NOTE — ASSESSMENT & PLAN NOTE
Patient reports chronic opioid use  Last use 12/12 (initially reported 1 pm on 12/12; today states sometime evening of 12/12 before ED presentation)  Denies further withdrawal symptoms

## 2023-12-16 NOTE — NURSING NOTE
While removing patient's IV, patient expressed needing to nap for a while after lunch before leaving.

## 2023-12-16 NOTE — PLAN OF CARE
Problem: SUBSTANCE USE/ABUSE  Goal: By discharge, patient will have ongoing treatment plan addressing chemical dependency  Description: INTERVENTIONS:  - Assist patient with resources and/or appointments for ongoing recovery based living  Outcome: Progressing

## 2023-12-18 NOTE — CASE MANAGEMENT
MARLA rec'd call from Rita at [facility]; pt scheduled for intake appt on Wed 12/20 at 9 AM. Call made to pt's cell# [phone]; informed her of intake appt. Pt reports unsure about IOP at this time. CM advised pt to contact Rita to discuss outpatient services. CM informed pt of need to cont with treatment due to pt's MAT needs. Pt agreed to call Rita.

## 2023-12-30 ENCOUNTER — HOSPITAL ENCOUNTER (EMERGENCY)
Facility: HOSPITAL | Age: 33
Discharge: HOME | End: 2023-12-30
Attending: EMERGENCY MEDICINE
Payer: COMMERCIAL

## 2023-12-30 VITALS
SYSTOLIC BLOOD PRESSURE: 118 MMHG | DIASTOLIC BLOOD PRESSURE: 72 MMHG | TEMPERATURE: 97.5 F | BODY MASS INDEX: 28.98 KG/M2 | OXYGEN SATURATION: 98 % | WEIGHT: 163.6 LBS | HEART RATE: 78 BPM | RESPIRATION RATE: 16 BRPM

## 2023-12-30 DIAGNOSIS — T15.91XA EYE FOREIGN BODY, RIGHT, INITIAL ENCOUNTER: ICD-10-CM

## 2023-12-30 DIAGNOSIS — Z76.0 ENCOUNTER FOR MEDICATION REFILL: Primary | ICD-10-CM

## 2023-12-30 PROCEDURE — 65205 REMOVE FOREIGN BODY FROM EYE: CPT | Performed by: EMERGENCY MEDICINE

## 2023-12-30 PROCEDURE — 99284 EMERGENCY DEPT VISIT MOD MDM: CPT | Performed by: EMERGENCY MEDICINE

## 2023-12-30 PROCEDURE — 99282 EMERGENCY DEPT VISIT SF MDM: CPT

## 2023-12-30 RX ORDER — BUPRENORPHINE AND NALOXONE 8; 2 MG/1; MG/1
8 FILM, SOLUBLE BUCCAL; SUBLINGUAL 2 TIMES DAILY
Qty: 14 FILM | Refills: 0 | Status: SHIPPED | OUTPATIENT
Start: 2023-12-30 | End: 2024-01-06

## 2023-12-30 RX ORDER — BUPRENORPHINE AND NALOXONE 8; 2 MG/1; MG/1
8 FILM, SOLUBLE BUCCAL; SUBLINGUAL 2 TIMES DAILY
Qty: 14 FILM | Refills: 0 | Status: SHIPPED | OUTPATIENT
Start: 2023-12-30 | End: 2023-12-30

## 2023-12-30 RX ORDER — TETRACAINE HYDROCHLORIDE 5 MG/ML
1 SOLUTION OPHTHALMIC ONCE
Status: COMPLETED | OUTPATIENT
Start: 2023-12-30 | End: 2023-12-30

## 2023-12-30 RX ADMIN — TETRACAINE HYDROCHLORIDE 1 DROP: 5 SOLUTION OPHTHALMIC at 18:47

## 2023-12-30 RX ADMIN — FLUORESCEIN SODIUM 1 STRIP: 1 STRIP OPHTHALMIC at 18:47

## 2023-12-30 NOTE — ED PROVIDER NOTES
History  Chief Complaint   Patient presents with    Leg Swelling     States swelling both ankles for 5 months.     Eye Problem     States could not go to work because her right eye is bothering her.     Medication Refill     States she only has 2 Suboxone left     Pt is a 34yo F who presents for eye irritation and medication refill.  Patient reports when she got to work today she began to notice irritation in her right eye.  Patient reports she did have contact in the eye immediately to get out.  Patient reports this contact is new as of 2 to 3 days ago.  Patient states she attempted to flush her eye but did not have any relief of symptoms.  Patient reports she did not notice any change to her vision.  Patient denies any discharge from the eye.  Patient reports pain particularly with blinking.  Patient states no issues with left eye.  Patient also reports that she is on Suboxone and only has enough left for 1 more day.  Patient states she does not yet have a primary care provider as she was working on getting insurance and therefore does not have any refills on her Suboxone.  Patient states she has otherwise been feeling well.        Prior to Admission Medications   Prescriptions Last Dose Informant Patient Reported? Taking?   buprenorphine-naloxone (Suboxone) 8-2 mg   No No   Sig: Place 1 Film (8 mg total) under the tongue 2 (two) times a day   lamoTRIgine (LaMICtal) 200 MG tablet   No No   Sig: Take 1 tablet (200 mg total) by mouth daily      Facility-Administered Medications: None       Past Medical History:   Diagnosis Date    ETOH abuse     Opiate dependence (HCC)     suboxone therapy    Psychiatric disorder        Past Surgical History:   Procedure Laterality Date     SECTION         History reviewed. No pertinent family history.  I have reviewed and agree with the history as documented.    E-Cigarette/Vaping    E-Cigarette Use Current Every Day User      E-Cigarette/Vaping Substances    Nicotine Yes      Flavoring Yes      Social History     Tobacco Use    Smoking status: Every Day     Current packs/day: 0.25     Types: Cigarettes    Smokeless tobacco: Never   Vaping Use    Vaping status: Every Day    Substances: Nicotine, Flavoring   Substance Use Topics    Alcohol use: Yes     Comment: vodka daily 10 shots    Drug use: Not Currently     Types: Marijuana     Comment: suboxone       Review of Systems   Eyes:  Positive for itching (R).   All other systems reviewed and are negative.      Physical Exam  Physical Exam  Vitals reviewed.   Constitutional:       General: She is not in acute distress.     Appearance: She is well-developed. She is not toxic-appearing or diaphoretic.   HENT:      Head: Normocephalic and atraumatic.      Right Ear: External ear normal.      Left Ear: External ear normal.      Nose: Nose normal.      Mouth/Throat:      Pharynx: Oropharynx is clear.   Eyes:      General: Lids are normal. Vision grossly intact. Gaze aligned appropriately.      Extraocular Movements: Extraocular movements intact.      Conjunctiva/sclera:      Right eye: Right conjunctiva is injected.      Pupils: Pupils are equal, round, and reactive to light.   Cardiovascular:      Rate and Rhythm: Normal rate and regular rhythm.      Heart sounds: Normal heart sounds. No murmur heard.  Pulmonary:      Effort: Pulmonary effort is normal. No respiratory distress.      Breath sounds: Normal breath sounds.   Abdominal:      General: There is no distension.      Palpations: Abdomen is soft.      Tenderness: There is no abdominal tenderness.   Musculoskeletal:         General: Normal range of motion.      Cervical back: Normal range of motion and neck supple.      Right lower leg: No edema.      Left lower leg: No edema.   Skin:     General: Skin is warm and dry.      Capillary Refill: Capillary refill takes less than 2 seconds.      Coloration: Skin is not pale.      Findings: No erythema or rash.   Neurological:      General: No  focal deficit present.      Mental Status: She is alert and oriented to person, place, and time.   Psychiatric:         Speech: Speech normal.         Behavior: Behavior is cooperative.         Vital Signs  ED Triage Vitals [12/30/23 1749]   Temperature Pulse Respirations Blood Pressure SpO2   97.5 °F (36.4 °C) 104 20 123/73 99 %      Temp Source Heart Rate Source Patient Position - Orthostatic VS BP Location FiO2 (%)   Tympanic Monitor Sitting Left arm --      Pain Score       --           Vitals:    12/30/23 1749 12/30/23 1932   BP: 123/73 118/72   Pulse: 104 78   Patient Position - Orthostatic VS: Sitting Sitting         Visual Acuity      ED Medications  Medications   fluorescein sodium sterile ophthalmic strip 1 strip (1 strip Both Eyes Given 12/30/23 1847)   tetracaine 0.5 % ophthalmic solution 1 drop (1 drop Both Eyes Given 12/30/23 1847)       Diagnostic Studies  Results Reviewed       None                   No orders to display              Procedures  Foreign Body - Ocular    Date/Time: 12/30/2023 7:12 PM    Performed by: Jennifer Marie MD  Authorized by: Jennifer Marie MD    Patient location:  ED  Consent:     Consent obtained:  Verbal    Consent given by:  Patient  Location:     Location:  R conjunctival    Depth:  Superficial  Pre-procedure details:     Imaging:  None    Fluorescein exam: yes      Fluorescein uptake: no    Anesthesia (see MAR for exact dosages):     Local anesthetic:  Tetracaine drops  Procedure details:     Localization method:  Direct visualization    Removal mechanism:  Moist cotton swab    Foreign bodies recovered:  1    Description:  Hair vs fiber    Intact foreign body removal: yes      Residual rust ring observed: no    Post-procedure details:     Confirmation:  No additional foreign bodies on visualization    Patient tolerance of procedure:  Tolerated well, no immediate complications           ED Course  ED Course as of 12/31/23 0022   Sat Dec 30, 2023   1835 Pt  arrives with suboxone bottle. Pt with appropriate amount based on when script written. Pt states she has been taking it regularly which is supported by previously stated reasoning. Will prescribe one more week and encourage pt to establish care for further refills.    1912 FB removed from R eye                               SBIRT 20yo+      Flowsheet Row Most Recent Value   Initial Alcohol Screen: US AUDIT-C     1. How often do you have a drink containing alcohol? --  [states every other day 2 shots or a drink or two] Filed at: 12/30/2023 1751   2. How many drinks containing alcohol do you have on a typical day you are drinking?  1 Filed at: 12/30/2023 1751   3b. FEMALE Any Age, or MALE 65+: How often do you have 4 or more drinks on one occassion? --  [Patient is evasive with answers] Filed at: 12/30/2023 1751   Audit-C Score 1 Filed at: 12/30/2023 1751                      Medical Decision Making  Pt is a 32yo F who presents with eye irritation.    Differential diagnosis to include but not limited to foreign body, corneal abrasion, corneal ulcer.  Will plan for numbing and staining of the eye. See ED course and procedure note for results and details.    Plan to discharge pt with f/u to PCP and ophtho. Discussed returning the ED with new or worsening of symptoms. Discussed use of over the counter medications as stated on the bottle as needed for pain. Discussed taking suboxone as prescribed. Pt expressed understanding of discharge instructions, return precautions, and medication instructions and is stable for discharge at this time. All questions were answered and pt was discharged without incident.       Risk  Prescription drug management.             Disposition  Final diagnoses:   Encounter for medication refill   Eye foreign body, right, initial encounter     Time reflects when diagnosis was documented in both MDM as applicable and the Disposition within this note       Time User Action Codes Description Comment     12/30/2023  6:31 PM Jennifer Marie [Z76.0] Encounter for medication refill     12/30/2023  7:13 PM Jennifer Marie [T15.91XA] Eye foreign body, right, initial encounter           ED Disposition       ED Disposition   Discharge    Condition   Stable    Date/Time   Sat Dec 30, 2023 1831    Comment   Steffanie Jackson discharge to home/self care.                   Follow-up Information       Follow up With Specialties Details Why Contact Info    [facility] Call  As needed [address] [facility] [address] [facility]  [address]  [facility] [address]  [phone]      [facility]  Call  As needed [phone]      Derick Mendez MD Ophthalmology Call on 1/2/2024  [address] [facility]  [address] [place] [address]  [phone]              Discharge Medication List as of 12/30/2023  7:14 PM        CONTINUE these medications which have CHANGED    Details   !! buprenorphine-naloxone (Suboxone) 8-2 mg Place 1 Film (8 mg total) under the tongue 2 (two) times a day for 7 days, Starting Sat 12/30/2023, Until Sat 1/6/2024, Normal       !! - Potential duplicate medications found. Please discuss with provider.        CONTINUE these medications which have NOT CHANGED    Details   !! buprenorphine-naloxone (Suboxone) 8-2 mg Place 1 Film (8 mg total) under the tongue 2 (two) times a day, Starting Fri 12/15/2023, Until Sun 1/14/2024, Normal      lamoTRIgine (LaMICtal) 200 MG tablet Take 1 tablet (200 mg total) by mouth daily, Starting Wed 11/29/2023, Normal       !! - Potential duplicate medications found. Please discuss with provider.          No discharge procedures on file.    PDMP Review       None            ED Provider  Electronically Signed by             Jennifer Marie MD  12/31/23 0022

## 2023-12-30 NOTE — DISCHARGE INSTRUCTIONS
Follow-up with primary care and ophthalmology for further care. Contact info provided below if needed.  Use over the counter medications as stated on the bottle as needed for symptom control.  Take your new medications as prescribed.  Return to the ED with new or worsening symptoms.      No

## 2023-12-30 NOTE — Clinical Note
Steffanie Jackson was seen and treated in our emergency department on 12/30/2023.                Diagnosis:     Steffanie  .    She may return on this date: 01/02/2024         If you have any questions or concerns, please don't hesitate to call.      Jennifer Marie MD    ______________________________           _______________          _______________  Hospital Representative                              Date                                Time

## 2024-01-03 ENCOUNTER — HOSPITAL ENCOUNTER (EMERGENCY)
Facility: HOSPITAL | Age: 34
Discharge: HOME | End: 2024-01-03
Attending: EMERGENCY MEDICINE
Payer: COMMERCIAL

## 2024-01-03 VITALS
TEMPERATURE: 98.4 F | WEIGHT: 165 LBS | HEART RATE: 109 BPM | RESPIRATION RATE: 18 BRPM | BODY MASS INDEX: 29.23 KG/M2 | DIASTOLIC BLOOD PRESSURE: 90 MMHG | SYSTOLIC BLOOD PRESSURE: 135 MMHG | OXYGEN SATURATION: 96 %

## 2024-01-03 DIAGNOSIS — Z76.0 MEDICATION REFILL: Primary | ICD-10-CM

## 2024-01-03 DIAGNOSIS — F31.9 BIPOLAR DISORDER (HCC): ICD-10-CM

## 2024-01-03 PROCEDURE — 99281 EMR DPT VST MAYX REQ PHY/QHP: CPT

## 2024-01-03 PROCEDURE — 99284 EMERGENCY DEPT VISIT MOD MDM: CPT | Performed by: EMERGENCY MEDICINE

## 2024-01-03 RX ORDER — LAMOTRIGINE 100 MG/1
200 TABLET ORAL ONCE
Status: COMPLETED | OUTPATIENT
Start: 2024-01-03 | End: 2024-01-03

## 2024-01-03 RX ORDER — LAMOTRIGINE 200 MG/1
200 TABLET ORAL DAILY
Qty: 7 TABLET | Refills: 0 | Status: SHIPPED | OUTPATIENT
Start: 2024-01-03 | End: 2024-01-10

## 2024-01-03 RX ADMIN — LAMOTRIGINE 200 MG: 100 TABLET ORAL at 17:25

## 2024-01-03 NOTE — ED PROVIDER NOTES
History  Chief Complaint   Patient presents with    Medication Refill     Here for med refill of lamictal 200mg once/d.     34-year-old female with past history of bipolar disorder, presents to the ED for evaluation of medication refill.  Patient states that she ran out of her Lamictal and Suboxone 4 days ago.  Patient takes Lamictal 200 mg daily.  Patient states that her PCP forgot to send the prescription to our pharmacy.  Patient came to the ED requesting refill of her Lamictal.      Medication Refill      Prior to Admission Medications   Prescriptions Last Dose Informant Patient Reported? Taking?   buprenorphine-naloxone (Suboxone) 8-2 mg   No No   Sig: Place 1 Film (8 mg total) under the tongue 2 (two) times a day   buprenorphine-naloxone (Suboxone) 8-2 mg   No No   Sig: Place 1 Film (8 mg total) under the tongue 2 (two) times a day for 7 days   lamoTRIgine (LaMICtal) 200 MG tablet   No No   Sig: Take 1 tablet (200 mg total) by mouth daily      Facility-Administered Medications: None       Past Medical History:   Diagnosis Date    ETOH abuse     Opiate dependence (HCC)     suboxone therapy    Psychiatric disorder        Past Surgical History:   Procedure Laterality Date     SECTION         History reviewed. No pertinent family history.  I have reviewed and agree with the history as documented.    E-Cigarette/Vaping    E-Cigarette Use Current Every Day User      E-Cigarette/Vaping Substances    Nicotine Yes     Flavoring Yes      Social History     Tobacco Use    Smoking status: Every Day     Current packs/day: 0.25     Types: Cigarettes    Smokeless tobacco: Never   Vaping Use    Vaping status: Every Day    Substances: Nicotine, Flavoring   Substance Use Topics    Alcohol use: Yes     Comment: vodka daily 10 shots    Drug use: Not Currently     Types: Marijuana     Comment: suboxone       Review of Systems   Constitutional:  Negative for chills and fever.   HENT:  Negative for ear pain and sore throat.     Eyes:  Negative for pain and visual disturbance.   Respiratory:  Negative for cough and shortness of breath.    Cardiovascular:  Negative for chest pain and palpitations.   Gastrointestinal:  Negative for abdominal pain and vomiting.   Genitourinary:  Negative for dysuria and hematuria.   Musculoskeletal:  Negative for arthralgias and back pain.   Skin:  Negative for color change and rash.   Neurological:  Negative for seizures and syncope.   All other systems reviewed and are negative.      Physical Exam  Physical Exam  Vitals and nursing note reviewed.   Constitutional:       General: She is not in acute distress.     Appearance: She is well-developed.   HENT:      Head: Normocephalic and atraumatic.   Eyes:      Conjunctiva/sclera: Conjunctivae normal.   Cardiovascular:      Rate and Rhythm: Normal rate and regular rhythm.      Heart sounds: No murmur heard.  Pulmonary:      Effort: Pulmonary effort is normal. No respiratory distress.      Breath sounds: Normal breath sounds.   Abdominal:      Palpations: Abdomen is soft.      Tenderness: There is no abdominal tenderness.   Musculoskeletal:         General: No swelling.      Cervical back: Neck supple.   Skin:     General: Skin is warm and dry.      Capillary Refill: Capillary refill takes less than 2 seconds.   Neurological:      Mental Status: She is alert.   Psychiatric:         Mood and Affect: Mood normal.         Vital Signs  ED Triage Vitals [01/03/24 1634]   Temperature Pulse Respirations Blood Pressure SpO2   98.4 °F (36.9 °C) (!) 109 18 135/90 96 %      Temp Source Heart Rate Source Patient Position - Orthostatic VS BP Location FiO2 (%)   Tympanic Monitor Sitting Right arm --      Pain Score       --           Vitals:    01/03/24 1634   BP: 135/90   Pulse: (!) 109   Patient Position - Orthostatic VS: Sitting         Visual Acuity      ED Medications  Medications   lamoTRIgine (LaMICtal) tablet 200 mg (has no administration in time range)        Diagnostic Studies  Results Reviewed       None                   No orders to display              Procedures  Procedures         ED Course                                             Medical Decision Making  Patient presented to the ED for refill of her Lipitor.  Patient given 1 week supply and told to follow-up with her PCP who can prescribe refills of all of her current daily medications.  I told patient that I am unable to refill Suboxone from the emergency department.  Patient is to contact her prescribing physician for that medication.   Close return instructions given to return to the ER for any worsening symptoms.  Patient agrees with discharge plan.  Patient well appearing at time of discharge.    Please Note: Fluency Direct voice recognition software may have been used in the creation of this document. Wrong words or sound a like substitutions may have occurred due to the inherent limitations of the voice software.         Risk  Prescription drug management.             Disposition  Final diagnoses:   Medication refill   Bipolar disorder (HCC)     Time reflects when diagnosis was documented in both MDM as applicable and the Disposition within this note       Time User Action Codes Description Comment    1/3/2024  5:17 PM Ruby Mendez [Z76.0] Medication refill     1/3/2024  5:18 PM Ruby Mendez [F31.9] Bipolar disorder (HCC)           ED Disposition       ED Disposition   Discharge    Condition   Stable    Date/Time   Wed Alex 3, 2024  5:17 PM    Comment   Steffanie Jackson discharge to home/self care.                   Follow-up Information       Follow up With Specialties Details Why Contact Info    Your family doctor  Schedule an appointment as soon as possible for a visit               Patient's Medications   Discharge Prescriptions    LAMOTRIGINE (LAMICTAL) 200 MG TABLET    Take 1 tablet (200 mg total) by mouth daily for 7 days       Start Date: 1/3/2024  End Date: 1/10/2024        Order Dose: 200 mg       Quantity: 7 tablet    Refills: 0       No discharge procedures on file.    PDMP Review       None            ED Provider  Electronically Signed by             Ruby Mendez DO  01/03/24 4044

## 2024-01-10 ENCOUNTER — HOSPITAL ENCOUNTER (EMERGENCY)
Facility: HOSPITAL | Age: 34
Discharge: HOME | End: 2024-01-10
Attending: EMERGENCY MEDICINE
Payer: COMMERCIAL

## 2024-01-10 VITALS
OXYGEN SATURATION: 99 % | HEART RATE: 76 BPM | RESPIRATION RATE: 20 BRPM | SYSTOLIC BLOOD PRESSURE: 141 MMHG | TEMPERATURE: 99.2 F | DIASTOLIC BLOOD PRESSURE: 90 MMHG

## 2024-01-10 DIAGNOSIS — Z76.0 MEDICATION REFILL: Primary | ICD-10-CM

## 2024-01-10 PROCEDURE — 99281 EMR DPT VST MAYX REQ PHY/QHP: CPT

## 2024-01-10 PROCEDURE — 99284 EMERGENCY DEPT VISIT MOD MDM: CPT | Performed by: EMERGENCY MEDICINE

## 2024-01-10 RX ORDER — LAMOTRIGINE 200 MG/1
200 TABLET ORAL DAILY
Qty: 4 TABLET | Refills: 0 | Status: SHIPPED | OUTPATIENT
Start: 2024-01-10 | End: 2024-01-15 | Stop reason: CLARIF

## 2024-01-11 ENCOUNTER — TELEPHONE (OUTPATIENT)
Dept: FAMILY MEDICINE CLINIC | Facility: CLINIC | Age: 34
End: 2024-01-11

## 2024-01-11 NOTE — TELEPHONE ENCOUNTER
Vm on clinical line:    Deshawn, my name is Steffanie Gallo. I'm calling because I was told that I went to the emergency room last night to get my Suboxone script refilled and my Lamictal resale. However, they could only refill the Lamictal. So I'm looking to get a bridge script or contact anyone who could possibly help me today because I am currently out of my prescription. So my name is Steffanie Sheppard. Phone number [phone]. Thank you.    Called back & informed we are not brining on any suboxone pts at present time,

## 2024-01-11 NOTE — ED PROVIDER NOTES
History  Chief Complaint   Patient presents with    Medication Refill     Here for refill of lamictal and suboxone     34-year-old female here for Lamictal and Suboxone refills.  She has been coming here she said since October every time she needs to get a refill and has not had to follow-up with the PCP.  Advised her she needs to get a PCP at this point in order to get refills this is not an appropriate ED visit.        Prior to Admission Medications   Prescriptions Last Dose Informant Patient Reported? Taking?   buprenorphine-naloxone (Suboxone) 8-2 mg   No No   Sig: Place 1 Film (8 mg total) under the tongue 2 (two) times a day   lamoTRIgine (LaMICtal) 200 MG tablet   No No   Sig: Take 1 tablet (200 mg total) by mouth daily   lamoTRIgine (LaMICtal) 200 MG tablet   No No   Sig: Take 1 tablet (200 mg total) by mouth daily for 7 days      Facility-Administered Medications: None       Past Medical History:   Diagnosis Date    ETOH abuse     Opiate dependence (HCC)     suboxone therapy    Psychiatric disorder        Past Surgical History:   Procedure Laterality Date     SECTION         History reviewed. No pertinent family history.  I have reviewed and agree with the history as documented.    E-Cigarette/Vaping    E-Cigarette Use Current Every Day User      E-Cigarette/Vaping Substances    Nicotine Yes     Flavoring Yes      Social History     Tobacco Use    Smoking status: Every Day     Current packs/day: 0.25     Types: Cigarettes    Smokeless tobacco: Never   Vaping Use    Vaping status: Every Day    Substances: Nicotine, Flavoring   Substance Use Topics    Alcohol use: Yes     Comment: vodka daily 10 shots    Drug use: Not Currently     Types: Marijuana     Comment: suboxone       Review of Systems   Constitutional:  Negative for activity change, chills, diaphoresis and fever.   HENT:  Negative for congestion, ear pain, nosebleeds, sore throat, trouble swallowing and voice change.    Eyes:  Negative for  pain, discharge and redness.   Respiratory:  Negative for apnea, cough, choking, shortness of breath, wheezing and stridor.    Cardiovascular:  Negative for chest pain and palpitations.   Gastrointestinal:  Negative for abdominal distention, abdominal pain, constipation, diarrhea, nausea and vomiting.   Endocrine: Negative for polydipsia.   Genitourinary:  Negative for difficulty urinating, dysuria, flank pain, frequency, hematuria and urgency.   Musculoskeletal:  Negative for back pain, gait problem, joint swelling, myalgias, neck pain and neck stiffness.   Skin:  Negative for pallor and rash.   Neurological:  Negative for dizziness, tremors, syncope, speech difficulty, weakness, numbness and headaches.   Hematological:  Negative for adenopathy.   Psychiatric/Behavioral:  Negative for confusion, hallucinations, self-injury and suicidal ideas. The patient is not nervous/anxious.        Physical Exam  Physical Exam  Vitals and nursing note reviewed.   Constitutional:       General: She is not in acute distress.     Appearance: She is well-developed. She is not diaphoretic.   HENT:      Head: Normocephalic and atraumatic.      Right Ear: External ear normal.      Left Ear: External ear normal.      Nose: Nose normal.   Eyes:      Conjunctiva/sclera: Conjunctivae normal.      Pupils: Pupils are equal, round, and reactive to light.   Cardiovascular:      Rate and Rhythm: Normal rate and regular rhythm.      Heart sounds: Normal heart sounds.   Pulmonary:      Effort: Pulmonary effort is normal.      Breath sounds: Normal breath sounds.   Abdominal:      General: Bowel sounds are normal.      Palpations: Abdomen is soft.      Tenderness: There is abdominal tenderness.      Comments: Diffuse tenderness   Musculoskeletal:         General: Normal range of motion.      Cervical back: Normal range of motion and neck supple.   Skin:     General: Skin is warm and dry.   Neurological:      Mental Status: She is alert and  oriented to person, place, and time.      Deep Tendon Reflexes: Reflexes are normal and symmetric.   Psychiatric:         Behavior: Behavior is cooperative.         Vital Signs  ED Triage Vitals [01/10/24 1741]   Temperature Pulse Respirations Blood Pressure SpO2   99.2 °F (37.3 °C) 76 20 141/90 99 %      Temp Source Heart Rate Source Patient Position - Orthostatic VS BP Location FiO2 (%)   Tympanic Monitor Sitting Right arm --      Pain Score       No Pain           Vitals:    01/10/24 1741   BP: 141/90   Pulse: 76   Patient Position - Orthostatic VS: Sitting         Visual Acuity      ED Medications  Medications - No data to display    Diagnostic Studies  Results Reviewed       None                   No orders to display              Procedures  Procedures         ED Course                                             Medical Decision Making  Risk  Prescription drug management.             Disposition  Final diagnoses:   Medication refill     Time reflects when diagnosis was documented in both MDM as applicable and the Disposition within this note       Time User Action Codes Description Comment    1/10/2024  6:14 PM Joby Strange Add [Z76.0] Medication refill           ED Disposition       ED Disposition   Discharge    Condition   Stable    Date/Time   Wed Alex 10, 2024  6:14 PM    Comment   Steffanie Jackson discharge to home/self care.                   Follow-up Information       Follow up With Specialties Details Why Contact Info    Pavel Rubio MD Family Medicine Schedule an appointment as soon as possible for a visit  As needed [address] [address]  [phone]              Discharge Medication List as of 1/10/2024  6:16 PM        START taking these medications    Details   !! lamoTRIgine (LaMICtal) 200 MG tablet Take 1 tablet (200 mg total) by mouth daily for 4 days, Starting Wed 1/10/2024, Until Sun 1/14/2024, Normal       !! - Potential duplicate medications found. Please  discuss with provider.        CONTINUE these medications which have NOT CHANGED    Details   buprenorphine-naloxone (Suboxone) 8-2 mg Place 1 Film (8 mg total) under the tongue 2 (two) times a day, Starting Fri 12/15/2023, Until Sun 1/14/2024, Normal      !! lamoTRIgine (LaMICtal) 200 MG tablet Take 1 tablet (200 mg total) by mouth daily, Starting Wed 11/29/2023, Normal      !! lamoTRIgine (LaMICtal) 200 MG tablet Take 1 tablet (200 mg total) by mouth daily for 7 days, Starting Wed 1/3/2024, Until Wed 1/10/2024, Normal       !! - Potential duplicate medications found. Please discuss with provider.          No discharge procedures on file.    PDMP Review       None            ED Provider  Electronically Signed by             Joby Strange DO  01/10/24 4716

## 2024-01-15 ENCOUNTER — HOSPITAL ENCOUNTER (EMERGENCY)
Facility: HOSPITAL | Age: 34
Discharge: HOME | End: 2024-01-15
Attending: EMERGENCY MEDICINE
Payer: COMMERCIAL

## 2024-01-15 ENCOUNTER — APPOINTMENT (EMERGENCY)
Dept: RADIOLOGY | Facility: HOSPITAL | Age: 34
End: 2024-01-15
Payer: COMMERCIAL

## 2024-01-15 VITALS
TEMPERATURE: 98.6 F | DIASTOLIC BLOOD PRESSURE: 85 MMHG | OXYGEN SATURATION: 98 % | SYSTOLIC BLOOD PRESSURE: 147 MMHG | HEART RATE: 80 BPM | RESPIRATION RATE: 20 BRPM

## 2024-01-15 DIAGNOSIS — Z76.0 ENCOUNTER FOR MEDICATION REFILL: ICD-10-CM

## 2024-01-15 DIAGNOSIS — Z76.0 MEDICATION REFILL: ICD-10-CM

## 2024-01-15 DIAGNOSIS — S93.409A ANKLE SPRAIN: Primary | ICD-10-CM

## 2024-01-15 PROCEDURE — 73610 X-RAY EXAM OF ANKLE: CPT

## 2024-01-15 PROCEDURE — 99284 EMERGENCY DEPT VISIT MOD MDM: CPT | Performed by: EMERGENCY MEDICINE

## 2024-01-15 PROCEDURE — 99283 EMERGENCY DEPT VISIT LOW MDM: CPT

## 2024-01-15 RX ORDER — IBUPROFEN 600 MG/1
600 TABLET ORAL ONCE
Status: COMPLETED | OUTPATIENT
Start: 2024-01-15 | End: 2024-01-15

## 2024-01-15 RX ORDER — LAMOTRIGINE 200 MG/1
200 TABLET ORAL DAILY
Qty: 14 TABLET | Refills: 0 | Status: SHIPPED | OUTPATIENT
Start: 2024-01-15 | End: 2024-01-27

## 2024-01-15 RX ADMIN — IBUPROFEN 600 MG: 600 TABLET, FILM COATED ORAL at 22:04
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
## 2024-01-23 ENCOUNTER — OFFICE VISIT (OUTPATIENT)
Dept: OBGYN CLINIC | Facility: CLINIC | Age: 34
End: 2024-01-23
Payer: COMMERCIAL

## 2024-01-23 VITALS
BODY MASS INDEX: 29.23 KG/M2 | HEIGHT: 63 IN | WEIGHT: 165 LBS | DIASTOLIC BLOOD PRESSURE: 87 MMHG | SYSTOLIC BLOOD PRESSURE: 132 MMHG | HEART RATE: 102 BPM

## 2024-01-23 DIAGNOSIS — S93.409A ANKLE SPRAIN: ICD-10-CM

## 2024-01-23 DIAGNOSIS — S93.05XA ACUTE TRAUMATIC INTERNAL DERANGEMENT OF LEFT ANKLE, INITIAL ENCOUNTER: Primary | ICD-10-CM

## 2024-01-23 PROCEDURE — 99204 OFFICE O/P NEW MOD 45 MIN: CPT | Performed by: ORTHOPAEDIC SURGERY

## 2024-01-23 NOTE — PROGRESS NOTES
Assessment/Plan:  1. Acute traumatic internal derangement of left ankle, initial encounter  MRI ankle/heel left  wo contrast      2. Ankle sprain  Ambulatory Referral to Orthopedic Surgery          Steffanie has left-sided ankle pain and abnormal appearing x-ray on her most recent trip to the emergency room.  There is not appear to be a fracture but the sclerosis and lucent region on the superior aspect of her ankle is concerning for a chronic fracture or avascular necrosis.  I have ordered an MRI of the left ankle for further evaluation at this time.  We did place in a cam walker boot to relieve her discomfort with ambulation.  If the pain persist we could add crutches if necessary.  Follow-up after MRI is complete.    Subjective:   Steffanie Jackson is a 34 y.o. female who presents to the office for evaluation for left-sided ankle pain.  She states she has had multiple ankle injuries and ankle sprains and most recently had an ankle inversion injury few weeks ago and ended up in the emergency room.  She had an x-ray which did not show an acute fracture but did show an abnormal appearance of her talus resulting in referral to our office.  She has continued pain and swelling about the ankle and difficulty walking.  She states she rolled her ankle multiple times over the last year due to broken stairs.   Today she has intermittent aching throbbing pain over the anterior and lateral aspect of her left ankle.  She denies any significant bruising.  Ice and anti-inflammatories have failed to help. She does have a history of documented alcohol abuse.       Review of Systems   Constitutional:  Negative for chills, fever and unexpected weight change.   HENT:  Negative for hearing loss, nosebleeds and sore throat.    Eyes:  Negative for pain, redness and visual disturbance.   Respiratory:  Negative for cough, shortness of breath and wheezing.    Cardiovascular:  Negative for chest pain, palpitations and leg swelling.    Gastrointestinal:  Negative for abdominal pain, nausea and vomiting.   Endocrine: Negative for polydipsia and polyuria.   Genitourinary:  Negative for dysuria and hematuria.   Musculoskeletal:         See HPI   Skin:  Negative for rash and wound.   Neurological:  Negative for dizziness, numbness and headaches.   Psychiatric/Behavioral:  Negative for decreased concentration and suicidal ideas. The patient is not nervous/anxious.          Past Medical History:   Diagnosis Date    ETOH abuse     Opiate dependence (HCC)     suboxone therapy    Psychiatric disorder        Past Surgical History:   Procedure Laterality Date     SECTION         History reviewed. No pertinent family history.    Social History     Occupational History    Not on file   Tobacco Use    Smoking status: Every Day     Current packs/day: 0.25     Types: Cigarettes    Smokeless tobacco: Never   Vaping Use    Vaping status: Every Day    Substances: Nicotine, Flavoring   Substance and Sexual Activity    Alcohol use: Not Currently    Drug use: Not Currently     Comment: suboxone    Sexual activity: Not on file         Current Outpatient Medications:     lamoTRIgine (LaMICtal) 200 MG tablet, Take 1 tablet (200 mg total) by mouth daily for 14 days, Disp: 14 tablet, Rfl: 0    No Known Allergies    Objective:  Vitals:    24 1057   BP: 132/87   Pulse: 102     Pain Score:   8      Left Ankle Exam     Tenderness   The patient is experiencing tenderness in the ATF (Severe pain over anterior and lateral talar region in the ankle.).   Swelling: moderate    Range of Motion   Dorsiflexion:  abnormal   Plantar flexion:  abnormal   Eversion:  abnormal   Inversion:  abnormal     Muscle Strength   Dorsiflexion:  4/5   Plantar flexion:  5/5   Anterior tibial:  5/5   Posterior tibial:  5/5  Gastrocsoleus:  5/5  Peroneal muscle:  5/5    Tests   Anterior drawer: negative    Other   Erythema: absent  Sensation: normal  Pulse:  present          Strength/Myotome Testing     Left Ankle/Foot   Dorsiflexion: 4  Plantar flexion: 5      Physical Exam  Vitals and nursing note reviewed.   Constitutional:       Appearance: Normal appearance. She is well-developed.   HENT:      Head: Normocephalic and atraumatic.      Right Ear: External ear normal.      Left Ear: External ear normal.   Eyes:      General: No scleral icterus.     Extraocular Movements: Extraocular movements intact.      Conjunctiva/sclera: Conjunctivae normal.   Cardiovascular:      Rate and Rhythm: Normal rate.   Pulmonary:      Effort: Pulmonary effort is normal. No respiratory distress.   Musculoskeletal:      Cervical back: Normal range of motion and neck supple.      Comments: See Ortho exam   Skin:     General: Skin is warm and dry.   Neurological:      General: No focal deficit present.      Mental Status: She is alert and oriented to person, place, and time.   Psychiatric:         Behavior: Behavior normal.         I have personally reviewed pertinent films in PACS and my interpretation is as follows:  X-ray of the left ankle from 1/15/2024 demonstrates an area of lucency and sclerosis in the superior aspect of the left talus.  No acute fracture visible.  No obvious osteochondral defect.      This document was created using speech voice recognition software.   Grammatical errors, random word insertions, pronoun errors, and incomplete sentences are an occasional consequence of this system due to software limitations, ambient noise, and hardware issues.   Any formal questions or concerns about content, text, or information contained within the body of this dictation should be directly addressed to the provider for clarification.

## 2024-01-24 ENCOUNTER — TELEPHONE (OUTPATIENT)
Dept: OBGYN CLINIC | Facility: HOSPITAL | Age: 34
End: 2024-01-24

## 2024-01-24 ENCOUNTER — TELEPHONE (OUTPATIENT)
Dept: FAMILY MEDICINE CLINIC | Facility: CLINIC | Age: 34
End: 2024-01-24

## 2024-01-24 NOTE — TELEPHONE ENCOUNTER
VM on appt line:    Hello, my name is Steffanie Sheppard, it's HECKE. My birthday is January 3rd, 1990. I'm just looking to become a new patient. I have a prescription for Suboxone and one for Lamictal that I need refill. So if you could please give me a call back. My number is [phone]. Thank you.    Spoke with pt - pt currently has [facility] plan, advised that we do not accept that insurance. She is going to call and try to change her plan to [employer].

## 2024-01-24 NOTE — TELEPHONE ENCOUNTER
Caller: Patient    Doctor: Lio    Reason for call: Patient needs a letter stating that she is unable to work so she can obtain [place] Medicaid.  Can we put in the mail for her please?    Call back#: [phone]

## 2024-01-24 NOTE — TELEPHONE ENCOUNTER
Dr. Vaca please advise I do not see a letter form her office visit addressing her work status.   Thank you!

## 2024-01-27 ENCOUNTER — HOSPITAL ENCOUNTER (EMERGENCY)
Facility: HOSPITAL | Age: 34
Discharge: HOME | End: 2024-01-27
Attending: EMERGENCY MEDICINE | Admitting: EMERGENCY MEDICINE
Payer: COMMERCIAL

## 2024-01-27 VITALS
HEART RATE: 72 BPM | OXYGEN SATURATION: 97 % | SYSTOLIC BLOOD PRESSURE: 122 MMHG | WEIGHT: 163.6 LBS | RESPIRATION RATE: 18 BRPM | DIASTOLIC BLOOD PRESSURE: 70 MMHG | BODY MASS INDEX: 28.98 KG/M2 | TEMPERATURE: 97.5 F

## 2024-01-27 DIAGNOSIS — Z76.0 MEDICATION REFILL: Primary | ICD-10-CM

## 2024-01-27 DIAGNOSIS — Z76.0 ENCOUNTER FOR MEDICATION REFILL: ICD-10-CM

## 2024-01-27 PROCEDURE — 99284 EMERGENCY DEPT VISIT MOD MDM: CPT | Performed by: EMERGENCY MEDICINE

## 2024-01-27 PROCEDURE — 99282 EMERGENCY DEPT VISIT SF MDM: CPT

## 2024-01-27 RX ORDER — LAMOTRIGINE 200 MG/1
200 TABLET ORAL DAILY
Qty: 14 TABLET | Refills: 0 | Status: SHIPPED | OUTPATIENT
Start: 2024-01-27 | End: 2024-02-10

## 2024-01-28 NOTE — ED NOTES
Pt seen, assessed and d/c by provider. Pt appeared to be in no acute distress upon discharge. Pt able to ambulate well without assistance upon exiting.      Bobbi Shaw RN  01/27/24 2024

## 2024-01-28 NOTE — ED PROVIDER NOTES
History  Chief Complaint   Patient presents with    Medication Refill     States she needs a refill for Lamictal and Suboxone ( seen here for same on , 1/3,1/10 and 1/15 )     34-year-old female with past history of opiate dependence on Suboxone therapy, alcohol abuse, psychiatric disorder, presents to the ED for medication refill.  Patient is requesting refill of her Lamictal and Suboxone.  Patient states that she has Medicaid insurance and no doctor around the area will cover her.  Patient has been coming to the ED repeatedly for medication refill.  Patient has no other complaints.      Medication Refill      Prior to Admission Medications   Prescriptions Last Dose Informant Patient Reported? Taking?   lamoTRIgine (LaMICtal) 200 MG tablet   No No   Sig: Take 1 tablet (200 mg total) by mouth daily for 14 days   lamoTRIgine (LaMICtal) 200 MG tablet   No Yes   Sig: Take 1 tablet (200 mg total) by mouth daily for 14 days      Facility-Administered Medications: None       Past Medical History:   Diagnosis Date    ETOH abuse     Opiate dependence (HCC)     suboxone therapy    Psychiatric disorder        Past Surgical History:   Procedure Laterality Date     SECTION         History reviewed. No pertinent family history.  I have reviewed and agree with the history as documented.    E-Cigarette/Vaping    E-Cigarette Use Current Every Day User      E-Cigarette/Vaping Substances    Nicotine Yes     Flavoring Yes      Social History     Tobacco Use    Smoking status: Every Day     Current packs/day: 0.25     Types: Cigarettes    Smokeless tobacco: Never   Vaping Use    Vaping status: Every Day    Substances: Nicotine, Flavoring   Substance Use Topics    Alcohol use: Not Currently    Drug use: Not Currently     Comment: suboxone       Review of Systems   Constitutional:  Negative for chills and fever.   HENT:  Negative for ear pain and sore throat.    Eyes:  Negative for pain and visual disturbance.   Respiratory:   Negative for cough and shortness of breath.    Cardiovascular:  Negative for chest pain and palpitations.   Gastrointestinal:  Negative for abdominal pain and vomiting.   Genitourinary:  Negative for dysuria and hematuria.   Musculoskeletal:  Negative for arthralgias and back pain.   Skin:  Negative for color change and rash.   Neurological:  Negative for seizures and syncope.   All other systems reviewed and are negative.      Physical Exam  Physical Exam  Vitals and nursing note reviewed.   Constitutional:       General: She is not in acute distress.     Appearance: She is well-developed.   HENT:      Head: Normocephalic and atraumatic.   Eyes:      Conjunctiva/sclera: Conjunctivae normal.   Cardiovascular:      Rate and Rhythm: Normal rate and regular rhythm.      Heart sounds: No murmur heard.  Pulmonary:      Effort: Pulmonary effort is normal. No respiratory distress.      Breath sounds: Normal breath sounds.   Abdominal:      Palpations: Abdomen is soft.      Tenderness: There is no abdominal tenderness.   Musculoskeletal:         General: No swelling.      Cervical back: Neck supple.   Skin:     General: Skin is warm and dry.      Capillary Refill: Capillary refill takes less than 2 seconds.   Neurological:      Mental Status: She is alert.   Psychiatric:         Mood and Affect: Mood normal.         Vital Signs  ED Triage Vitals [01/27/24 1844]   Temperature Pulse Respirations Blood Pressure SpO2   97.5 °F (36.4 °C) 72 18 122/70 97 %      Temp Source Heart Rate Source Patient Position - Orthostatic VS BP Location FiO2 (%)   Tympanic Monitor Sitting Left arm --      Pain Score       --           Vitals:    01/27/24 1844   BP: 122/70   Pulse: 72   Patient Position - Orthostatic VS: Sitting         Visual Acuity      ED Medications  Medications - No data to display    Diagnostic Studies  Results Reviewed       None                   No orders to display              Procedures  Procedures         ED Course                                SBIRT 22yo+      Flowsheet Row Most Recent Value   Initial Alcohol Screen: US AUDIT-C     1. How often do you have a drink containing alcohol? 0 Filed at: 01/27/2024 1845   Audit-C Score 0 Filed at: 01/27/2024 1845   SHABNAM: How many times in the past year have you...    Used an illegal drug or used a prescription medication for non-medical reasons? Never Filed at: 01/27/2024 1845                      Medical Decision Making  Patient presented to the ED for medication refill.  I discussed with patient that is very important for her to find a family doctor that can refill her medications.  I told her I am unable to refill her Suboxone however I can refill her Lamictal for 2 weeks until she finds a physician.  Patient given phone number to our network to find physicians that will accept her insurance.  Close return instructions given to return to the ER for any worsening symptoms.  Patient agrees with discharge plan.  Patient well appearing at time of discharge.    Please Note: Fluency Direct voice recognition software may have been used in the creation of this document. Wrong words or sound a like substitutions may have occurred due to the inherent limitations of the voice software.         Risk  Prescription drug management.             Disposition  Final diagnoses:   Medication refill     Time reflects when diagnosis was documented in both MDM as applicable and the Disposition within this note       Time User Action Codes Description Comment    1/27/2024  8:02 PM Ruby Mendez Add [Z76.0] Medication refill     1/27/2024  8:02 PM Ruby Mendez Add [Z76.0] Encounter for medication refill           ED Disposition       ED Disposition   Discharge    Condition   Stable    Date/Time   Sat Jan 27, 2024 2002    Comment   Steffanie Jackson discharge to home/self care.                   Follow-up Information       Follow up With Specialties Details Why Contact Info    [facility]    [phone]       [facility] Schedule an appointment as soon as possible for a visit  Please call to establish visit with a primary care physician. [address] [facility] [address]  [phone]              Current Discharge Medication List        CONTINUE these medications which have CHANGED    Details   lamoTRIgine (LaMICtal) 200 MG tablet Take 1 tablet (200 mg total) by mouth daily for 14 days  Qty: 14 tablet, Refills: 0    Associated Diagnoses: Encounter for medication refill             No discharge procedures on file.    PDMP Review       None            ED Provider  Electronically Signed by             Ruby Mendez DO  01/27/24 2014

## 2024-02-03 ENCOUNTER — HOSPITAL ENCOUNTER (OUTPATIENT)
Dept: RADIOLOGY | Facility: HOSPITAL | Age: 34
Discharge: HOME | End: 2024-02-03
Attending: ORTHOPAEDIC SURGERY
Payer: MEDICAID

## 2024-02-03 DIAGNOSIS — S93.05XA ACUTE TRAUMATIC INTERNAL DERANGEMENT OF LEFT ANKLE, INITIAL ENCOUNTER: ICD-10-CM

## 2024-02-03 PROCEDURE — G1004 CDSM NDSC: HCPCS

## 2024-02-03 PROCEDURE — 73721 MRI JNT OF LWR EXTRE W/O DYE: CPT

## 2024-02-09 ENCOUNTER — TELEPHONE (OUTPATIENT)
Dept: OBGYN CLINIC | Facility: CLINIC | Age: 34
End: 2024-02-09

## 2024-02-09 NOTE — TELEPHONE ENCOUNTER
Jamie pt and advised   ----- Message from Trung Vaca DO sent at 2/9/2024  1:17 PM EST -----  Steffanie had an MRI of her left ankle.  Unfortunately it looks like she has something called avascular necrosis where the bone is starting to die within her ankle. I would typically recommend that she see an ankle surgeon however our ankle surgeon in our network is located in [place].  She is going to have to find another ankle specialist somewhere within [place] that takes her insurance.  I would recommend obtaining a copy of the MRI and the report to take to see them.

## 2024-02-12 ENCOUNTER — HOSPITAL ENCOUNTER (EMERGENCY)
Facility: HOSPITAL | Age: 34
Discharge: HOME | End: 2024-02-12
Attending: EMERGENCY MEDICINE
Payer: MEDICAID

## 2024-02-12 VITALS
DIASTOLIC BLOOD PRESSURE: 98 MMHG | WEIGHT: 163 LBS | OXYGEN SATURATION: 97 % | TEMPERATURE: 97.6 F | RESPIRATION RATE: 18 BRPM | BODY MASS INDEX: 28.88 KG/M2 | SYSTOLIC BLOOD PRESSURE: 151 MMHG | HEIGHT: 63 IN | HEART RATE: 64 BPM

## 2024-02-12 DIAGNOSIS — J32.9 SINUSITIS: ICD-10-CM

## 2024-02-12 DIAGNOSIS — Z76.0 MEDICATION REFILL: Primary | ICD-10-CM

## 2024-02-12 LAB
FLUAV RNA RESP QL NAA+PROBE: NEGATIVE
FLUBV RNA RESP QL NAA+PROBE: NEGATIVE
RSV RNA RESP QL NAA+PROBE: POSITIVE
SARS-COV-2 RNA RESP QL NAA+PROBE: POSITIVE

## 2024-02-12 PROCEDURE — 0241U HB NFCT DS VIR RESP RNA 4 TRGT: CPT | Performed by: EMERGENCY MEDICINE

## 2024-02-12 PROCEDURE — 99284 EMERGENCY DEPT VISIT MOD MDM: CPT | Performed by: EMERGENCY MEDICINE

## 2024-02-12 PROCEDURE — 99282 EMERGENCY DEPT VISIT SF MDM: CPT

## 2024-02-12 RX ORDER — FLUTICASONE PROPIONATE 50 MCG
1 SPRAY, SUSPENSION (ML) NASAL DAILY
Qty: 16 G | Refills: 0 | Status: SHIPPED | OUTPATIENT
Start: 2024-02-12

## 2024-02-12 RX ORDER — LAMOTRIGINE 200 MG/1
200 TABLET ORAL DAILY
Qty: 14 TABLET | Refills: 0 | Status: SHIPPED | OUTPATIENT
Start: 2024-02-12 | End: 2024-02-26

## 2024-02-12 NOTE — ED PROVIDER NOTES
History  Chief Complaint   Patient presents with    Medication Refill     Pt reports needs lamictal refill. Also reports URI symptoms     34-year-old female presents for medication refill of Lamictal.  She also has upper respiratory symptoms and sinus congestion.  Denies any cough fevers chills nausea vomiting she has to make an appointment with a PCP which she has not been able to do because of insurance issues. She takes lamictal for bipolar disorder      History provided by:  Patient   used: No        Prior to Admission Medications   Prescriptions Last Dose Informant Patient Reported? Taking?   lamoTRIgine (LaMICtal) 200 MG tablet   No No   Sig: Take 1 tablet (200 mg total) by mouth daily for 14 days      Facility-Administered Medications: None       Past Medical History:   Diagnosis Date    ETOH abuse     Opiate dependence (HCC)     suboxone therapy    Psychiatric disorder        Past Surgical History:   Procedure Laterality Date     SECTION         History reviewed. No pertinent family history.  I have reviewed and agree with the history as documented.    E-Cigarette/Vaping    E-Cigarette Use Current Every Day User      E-Cigarette/Vaping Substances    Nicotine Yes     Flavoring Yes      Social History     Tobacco Use    Smoking status: Every Day     Current packs/day: 0.25     Types: Cigarettes    Smokeless tobacco: Never   Vaping Use    Vaping status: Every Day    Substances: Nicotine, Flavoring   Substance Use Topics    Alcohol use: Not Currently    Drug use: Not Currently     Comment: suboxone       Review of Systems   Constitutional: Negative.    HENT:  Positive for congestion, sinus pressure and sinus pain.    Eyes: Negative.    Respiratory: Negative.     Cardiovascular: Negative.    Gastrointestinal: Negative.    Endocrine: Negative.    Genitourinary: Negative.    Musculoskeletal: Negative.    Skin: Negative.    Allergic/Immunologic: Negative.    Neurological: Negative.     Hematological: Negative.    Psychiatric/Behavioral: Negative.     All other systems reviewed and are negative.      Physical Exam  Physical Exam  Vitals and nursing note reviewed.   Constitutional:       Appearance: Normal appearance.   HENT:      Head: Normocephalic and atraumatic.      Nose: Nose normal.      Mouth/Throat:      Mouth: Mucous membranes are moist.   Eyes:      Extraocular Movements: Extraocular movements intact.      Pupils: Pupils are equal, round, and reactive to light.   Cardiovascular:      Rate and Rhythm: Normal rate and regular rhythm.   Pulmonary:      Effort: Pulmonary effort is normal.      Breath sounds: Normal breath sounds.   Abdominal:      General: Abdomen is flat. Bowel sounds are normal.      Palpations: Abdomen is soft.   Musculoskeletal:         General: Normal range of motion.      Cervical back: Normal range of motion and neck supple.   Skin:     General: Skin is warm.      Capillary Refill: Capillary refill takes less than 2 seconds.   Neurological:      General: No focal deficit present.      Mental Status: She is alert and oriented to person, place, and time. Mental status is at baseline.   Psychiatric:         Mood and Affect: Mood normal.         Thought Content: Thought content normal.         Vital Signs  ED Triage Vitals [02/12/24 1542]   Temperature Pulse Respirations Blood Pressure SpO2   97.6 °F (36.4 °C) 64 18 151/98 97 %      Temp Source Heart Rate Source Patient Position - Orthostatic VS BP Location FiO2 (%)   Oral Monitor -- -- --      Pain Score       No Pain           Vitals:    02/12/24 1542   BP: 151/98   Pulse: 64         Visual Acuity      ED Medications  Medications - No data to display    Diagnostic Studies  Results Reviewed       Procedure Component Value Units Date/Time    FLU/RSV/COVID - if FLU/RSV clinically relevant [[id number]] Collected: 02/12/24 1801    Lab Status: In process Specimen: Nares from Nose Updated: 02/12/24 1806                   No  orders to display              Procedures  Procedures         ED Course                                             Medical Decision Making  Patient evaluated with labs.  I reviewed the results and discussed them with the patient.  Patient discharged with appropriate instructions medications and follow-up.  Patient verbalized understanding had no further questions at the time of discharge.  Patient had stable vital signs and well-appearing at the time of discharge.             Disposition  Final diagnoses:   Medication refill   Sinusitis     Time reflects when diagnosis was documented in both MDM as applicable and the Disposition within this note       Time User Action Codes Description Comment    2/12/2024  6:10 PM Morelia Malin [Z76.0] Medication refill     2/12/2024  6:10 PM Morelia Malin [J32.9] Sinusitis           ED Disposition       ED Disposition   Discharge    Condition   Stable    Date/Time   Mon Feb 12, 2024  6:10 PM    Comment   Steffanie Jackson discharge to home/self care.                   Follow-up Information       Follow up With Specialties Details Why Contact Info Additional Information    [facility] Emergency Medicine  If symptoms worsen [address] [place] [address]  [phone] [facility] Department, [address] [place] [address], [address]            Patient's Medications   Discharge Prescriptions    FLUTICASONE (FLONASE) 50 MCG/ACT NASAL SPRAY    1 spray into each nostril daily       Start Date: 2/12/2024 End Date: --       Order Dose: 1 spray       Quantity: 16 g    Refills: 0    LAMOTRIGINE (LAMICTAL) 200 MG TABLET    Take 1 tablet (200 mg total) by mouth daily for 14 days       Start Date: 2/12/2024 End Date: 2/26/2024       Order Dose: 200 mg       Quantity: 14 tablet    Refills: 0       No discharge procedures on file.    PDMP Review       None            ED Provider  Electronically Signed  by             Morelia Malin,   02/12/24 1812

## 2024-02-25 ENCOUNTER — HOSPITAL ENCOUNTER (EMERGENCY)
Facility: HOSPITAL | Age: 34
Discharge: HOME | End: 2024-02-25
Attending: STUDENT IN AN ORGANIZED HEALTH CARE EDUCATION/TRAINING PROGRAM
Payer: MEDICAID

## 2024-02-25 VITALS
RESPIRATION RATE: 18 BRPM | HEART RATE: 100 BPM | HEIGHT: 63 IN | SYSTOLIC BLOOD PRESSURE: 125 MMHG | WEIGHT: 159.6 LBS | BODY MASS INDEX: 28.28 KG/M2 | OXYGEN SATURATION: 99 % | DIASTOLIC BLOOD PRESSURE: 82 MMHG | TEMPERATURE: 98.1 F

## 2024-02-25 DIAGNOSIS — H60.90 OTITIS EXTERNA: Primary | ICD-10-CM

## 2024-02-25 PROCEDURE — 99284 EMERGENCY DEPT VISIT MOD MDM: CPT | Performed by: STUDENT IN AN ORGANIZED HEALTH CARE EDUCATION/TRAINING PROGRAM

## 2024-02-25 PROCEDURE — 99282 EMERGENCY DEPT VISIT SF MDM: CPT

## 2024-02-25 RX ORDER — CIPROFLOXACIN AND DEXAMETHASONE 3; 1 MG/ML; MG/ML
4 SUSPENSION/ DROPS AURICULAR (OTIC) 2 TIMES DAILY
Qty: 7.5 ML | Refills: 0 | Status: SHIPPED | OUTPATIENT
Start: 2024-02-25 | End: 2024-03-03

## 2024-02-25 RX ORDER — CIPROFLOXACIN AND DEXAMETHASONE 3; 1 MG/ML; MG/ML
4 SUSPENSION/ DROPS AURICULAR (OTIC) 2 TIMES DAILY
Status: DISCONTINUED | OUTPATIENT
Start: 2024-02-25 | End: 2024-02-25

## 2024-02-25 RX ORDER — CIPROFLOXACIN AND DEXAMETHASONE 3; 1 MG/ML; MG/ML
4 SUSPENSION/ DROPS AURICULAR (OTIC) 2 TIMES DAILY
Status: DISCONTINUED | OUTPATIENT
Start: 2024-02-25 | End: 2024-02-25 | Stop reason: HOSPADM

## 2024-02-25 RX ADMIN — CIPROFLOXACIN AND DEXAMETHASONE 4 DROP: 3; 1 SUSPENSION/ DROPS AURICULAR (OTIC) at 15:37

## 2024-02-25 NOTE — DISCHARGE INSTRUCTIONS
You were seen in the ED for ear discharge. You will be treated with antibiotic ear drops. Please use in both ears two times daily for 7 days. Return to the ED for any worsening discharge, pain, fevers, trouble hearing, or for any other new or concerning symptoms.

## 2024-02-25 NOTE — ED PROVIDER NOTES
History  Chief Complaint   Patient presents with    Ear Problem     Pt c/o bilateral ear infections. Pt states 6 days ago she started with pain in left ear and 1 day ago pain started in right ear. Pt reports both are painful at a 6/10 with no relief from motrin. Pt reports yellow drainage from both ears and blood drainage from right. Pt reports 2 weeks ago testing postive for COVID and RSV 2 weeks. Ago.      Patient is a 34-year-old female, past medical history including alcohol abuse and opiate abuse, who presents to the emergency room for bilateral ear pain and discharge.  Started about 6 days ago to the left ear, followed by right ear yesterday.  States she is having discharge of both of her ears and it is painful.  No modifying factors.  No associated symptoms.  Denies any fevers or chills.  No headaches or vision changes.  No other complaints or concerns.        Prior to Admission Medications   Prescriptions Last Dose Informant Patient Reported? Taking?   fluticasone (FLONASE) 50 mcg/act nasal spray   No No   Si spray into each nostril daily   lamoTRIgine (LaMICtal) 200 MG tablet   No No   Sig: Take 1 tablet (200 mg total) by mouth daily for 14 days   lamoTRIgine (LaMICtal) 200 MG tablet   No No   Sig: Take 1 tablet (200 mg total) by mouth daily for 14 days      Facility-Administered Medications: None       Past Medical History:   Diagnosis Date    ETOH abuse     Opiate dependence (HCC)     suboxone therapy    Psychiatric disorder        Past Surgical History:   Procedure Laterality Date     SECTION         History reviewed. No pertinent family history.  I have reviewed and agree with the history as documented.    E-Cigarette/Vaping    E-Cigarette Use Current Every Day User      E-Cigarette/Vaping Substances    Nicotine Yes     Flavoring Yes      Social History     Tobacco Use    Smoking status: Every Day     Current packs/day: 0.25     Types: Cigarettes    Smokeless tobacco: Never   Vaping Use     Vaping status: Every Day    Substances: Nicotine, Flavoring   Substance Use Topics    Alcohol use: Not Currently    Drug use: Not Currently     Comment: suboxone       Review of Systems   Constitutional:  Negative for chills and fever.   HENT:  Positive for ear discharge and ear pain.    All other systems reviewed and are negative.      Physical Exam  Physical Exam  Vitals and nursing note reviewed.   Constitutional:       General: She is not in acute distress.     Appearance: She is well-developed. She is not ill-appearing, toxic-appearing or diaphoretic.   HENT:      Head: Normocephalic and atraumatic.      Right Ear: External ear normal. Drainage and swelling present. No middle ear effusion. There is no impacted cerumen. No mastoid tenderness.      Left Ear: External ear normal. Drainage and swelling present.  No middle ear effusion. There is no impacted cerumen. No mastoid tenderness.      Ears:      Comments: There is swelling of the external auditory canals bilaterally. Serous discharge noted. TM's are unremarkable. No mastoid tenderness or redness.      Nose: Nose normal.   Eyes:      General: Lids are normal. No scleral icterus.  Cardiovascular:      Rate and Rhythm: Normal rate and regular rhythm.   Pulmonary:      Effort: Pulmonary effort is normal. No respiratory distress.   Musculoskeletal:         General: No deformity. Normal range of motion.      Cervical back: Normal range of motion and neck supple.   Skin:     General: Skin is warm and dry.   Neurological:      General: No focal deficit present.      Mental Status: She is alert.   Psychiatric:         Mood and Affect: Mood normal.         Behavior: Behavior normal.         Vital Signs  ED Triage Vitals [02/25/24 1524]   Temperature Pulse Respirations Blood Pressure SpO2   98.1 °F (36.7 °C) 100 18 125/82 99 %      Temp Source Heart Rate Source Patient Position - Orthostatic VS BP Location FiO2 (%)   Oral Monitor Sitting Right arm --      Pain Score    "    6           Vitals:    02/25/24 1524   BP: 125/82   Pulse: 100   Patient Position - Orthostatic VS: Sitting         Visual Acuity      ED Medications  Medications   ciprofloxacin-dexamethasone (CIPRODEX) 0.3-0.1 % otic suspension 4 drop (4 drops Both Ears Given 2/25/24 1537)       Diagnostic Studies  Results Reviewed       None                   No orders to display              Procedures  Procedures         ED Course                               SBIRT 22yo+      Flowsheet Row Most Recent Value   Initial Alcohol Screen: US AUDIT-C     1. How often do you have a drink containing alcohol? 0 Filed at: 02/25/2024 1529   2. How many drinks containing alcohol do you have on a typical day you are drinking?  0 Filed at: 02/25/2024 1529   3a. Male UNDER 65: How often do you have five or more drinks on one occasion? 0 Filed at: 02/25/2024 1529   3b. FEMALE Any Age, or MALE 65+: How often do you have 4 or more drinks on one occassion? 0 Filed at: 02/25/2024 1529   Audit-C Score 0 Filed at: 02/25/2024 1529   SHABNAM: How many times in the past year have you...    Used an illegal drug or used a prescription medication for non-medical reasons? Never Filed at: 02/25/2024 1529                      Medical Decision Making  Patient is a 34 y.o. female who presents to the ED for bilateral ear pain and discharge.  Patient is nontoxic and well-appearing.  Vitals are stable.  On exam she has serous discharge from both ears as well as external auditory canal swelling.    Differential includes but is not limited to: Otitis externa.  Presentation not consistent with otitis media, mastoiditis.    Plan: Antibiotic eardrops, discharged with return precautions                 Portions of the record may have been created with voice recognition software. Occasional wrong word or \"sound a like\" substitutions may have occurred due to the inherent limitations of voice recognition software. Read the chart carefully and recognize, using context, " where substitutions have occurred.    Problems Addressed:  Otitis externa: acute illness or injury    Risk  Prescription drug management.             Disposition  Final diagnoses:   Otitis externa     Time reflects when diagnosis was documented in both MDM as applicable and the Disposition within this note       Time User Action Codes Description Comment    2/25/2024  3:33 PM Camacho Moseley Add [H60.90] Otitis externa           ED Disposition       ED Disposition   Discharge    Condition   Stable    Date/Time   Sun Feb 25, 2024 1533    Comment   Steffanie Jackson discharge to home/self care.                   Follow-up Information       Follow up With Specialties Details Why Contact Info Additional Information    [facility]  Call in 1 day  [phone]       [facility] Emergency Medicine   [address] [place] [id number] [phone] [facility], [address] [place] [address], [address]            Patient's Medications   Discharge Prescriptions    CIPROFLOXACIN-DEXAMETHASONE (CIPRODEX) OTIC SUSPENSION    Administer 4 drops into both ears 2 (two) times a day for 7 days       Start Date: 2/25/2024 End Date: 3/3/2024       Order Dose: 4 drops       Quantity: 7.5 mL    Refills: 0       No discharge procedures on file.    PDMP Review       None            ED Provider  Electronically Signed by             Camacho Moseley DO  02/25/24 5389

## 2024-04-23 ENCOUNTER — TELEPHONE (OUTPATIENT)
Age: 34
End: 2024-04-23

## 2024-04-23 NOTE — TELEPHONE ENCOUNTER
Caller: Self    Doctor: Lio    Reason for call: Patient following up on work note. Patient is a  and cannot work long hours with ankle swelling up.     Patient has two different fax numbers  [phone]        Call back#: [phone]

## 2024-04-23 NOTE — TELEPHONE ENCOUNTER
Jamie pt and informed Dr. Vaca can not provide that note as she was referred to podiatry .     Patient will call back to get scheduled with Dr. Harden , Please help patient get scheduled

## 2024-04-23 NOTE — TELEPHONE ENCOUNTER
Pt called back with Correct fax number regarding attached message:    This is the correct fax number:    [phone]     [phone]     Letter would need to be sent to both

## 2024-04-23 NOTE — TELEPHONE ENCOUNTER
Caller: patient    Doctor: Dr Vaca    Reason for call: patient requests a note stating she cannot work because she can't stand for more than 2 hours at a time, her ankle swells up to the size of a baseball.     Fax # [phone] attn: Sadaf Corona     Call back#: [phone]

## 2024-04-24 NOTE — TELEPHONE ENCOUNTER
Patient asking for work note, advised she has to see Podiatry.    Patient asked for a note stating she has an appt with Podiatry,  advised to ask the office she has the appt with,  xfer'd to Podiatry.

## 2024-04-25 ENCOUNTER — APPOINTMENT (EMERGENCY)
Dept: RADIOLOGY | Facility: HOSPITAL | Age: 34
End: 2024-04-25
Payer: COMMERCIAL

## 2024-04-25 ENCOUNTER — HOSPITAL ENCOUNTER (EMERGENCY)
Facility: HOSPITAL | Age: 34
End: 2024-04-26
Attending: STUDENT IN AN ORGANIZED HEALTH CARE EDUCATION/TRAINING PROGRAM
Payer: COMMERCIAL

## 2024-04-25 DIAGNOSIS — F14.90 COCAINE USE: ICD-10-CM

## 2024-04-25 DIAGNOSIS — H60.90 OTITIS EXTERNA: ICD-10-CM

## 2024-04-25 DIAGNOSIS — R45.851 SUICIDAL IDEATION: ICD-10-CM

## 2024-04-25 DIAGNOSIS — F10.10 ALCOHOL ABUSE: ICD-10-CM

## 2024-04-25 DIAGNOSIS — H66.90 OTITIS MEDIA: Primary | ICD-10-CM

## 2024-04-25 LAB
ALBUMIN SERPL BCP-MCNC: 4.3 G/DL (ref 3.5–5)
ALP SERPL-CCNC: 77 U/L (ref 34–104)
ALT SERPL W P-5'-P-CCNC: 9 U/L (ref 7–52)
AMPHETAMINES SERPL QL SCN: NEGATIVE
ANION GAP SERPL CALCULATED.3IONS-SCNC: 10 MMOL/L (ref 4–13)
APAP SERPL-MCNC: <2 UG/ML (ref 10–20)
AST SERPL W P-5'-P-CCNC: 10 U/L (ref 13–39)
BARBITURATES UR QL: NEGATIVE
BASOPHILS # BLD AUTO: 0.06 THOUSANDS/ÂΜL (ref 0–0.1)
BASOPHILS NFR BLD AUTO: 1 % (ref 0–1)
BENZODIAZ UR QL: NEGATIVE
BILIRUB SERPL-MCNC: 0.19 MG/DL (ref 0.2–1)
BILIRUB UR QL STRIP: NEGATIVE
BUN SERPL-MCNC: 9 MG/DL (ref 5–25)
CALCIUM SERPL-MCNC: 9.2 MG/DL (ref 8.4–10.2)
CHLORIDE SERPL-SCNC: 110 MMOL/L (ref 96–108)
CLARITY UR: CLEAR
CO2 SERPL-SCNC: 21 MMOL/L (ref 21–32)
COCAINE UR QL: POSITIVE
COLOR UR: COLORLESS
CREAT SERPL-MCNC: 0.7 MG/DL (ref 0.6–1.3)
EOSINOPHIL # BLD AUTO: 0.13 THOUSAND/ÂΜL (ref 0–0.61)
EOSINOPHIL NFR BLD AUTO: 1 % (ref 0–6)
ERYTHROCYTE [DISTWIDTH] IN BLOOD BY AUTOMATED COUNT: 12.7 % (ref 11.6–15.1)
ETHANOL SERPL-MCNC: 142 MG/DL
EXT PREGNANCY TEST URINE: NEGATIVE
EXT. CONTROL: NORMAL
FENTANYL UR QL SCN: NEGATIVE
GFR SERPL CREATININE-BSD FRML MDRD: 115 ML/MIN/1.73SQ M
GLUCOSE SERPL-MCNC: 86 MG/DL (ref 65–140)
GLUCOSE UR STRIP-MCNC: NEGATIVE MG/DL
HCG SERPL QL: NEGATIVE
HCT VFR BLD AUTO: 39.2 % (ref 34.8–46.1)
HGB BLD-MCNC: 12.7 G/DL (ref 11.5–15.4)
HGB UR QL STRIP.AUTO: NEGATIVE
HYDROCODONE UR QL SCN: NEGATIVE
IMM GRANULOCYTES # BLD AUTO: 0.03 THOUSAND/UL (ref 0–0.2)
IMM GRANULOCYTES NFR BLD AUTO: 0 % (ref 0–2)
KETONES UR STRIP-MCNC: NEGATIVE MG/DL
LEUKOCYTE ESTERASE UR QL STRIP: NEGATIVE
LYMPHOCYTES # BLD AUTO: 2.84 THOUSANDS/ÂΜL (ref 0.6–4.47)
LYMPHOCYTES NFR BLD AUTO: 30 % (ref 14–44)
MCH RBC QN AUTO: 29.3 PG (ref 26.8–34.3)
MCHC RBC AUTO-ENTMCNC: 32.4 G/DL (ref 31.4–37.4)
MCV RBC AUTO: 90 FL (ref 82–98)
METHADONE UR QL: NEGATIVE
MONOCYTES # BLD AUTO: 0.75 THOUSAND/ÂΜL (ref 0.17–1.22)
MONOCYTES NFR BLD AUTO: 8 % (ref 4–12)
NEUTROPHILS # BLD AUTO: 5.58 THOUSANDS/ÂΜL (ref 1.85–7.62)
NEUTS SEG NFR BLD AUTO: 60 % (ref 43–75)
NITRITE UR QL STRIP: NEGATIVE
NRBC BLD AUTO-RTO: 0 /100 WBCS
OPIATES UR QL SCN: NEGATIVE
OXYCODONE+OXYMORPHONE UR QL SCN: NEGATIVE
PCP UR QL: NEGATIVE
PH UR STRIP.AUTO: 5.5 [PH]
PLATELET # BLD AUTO: 344 THOUSANDS/UL (ref 149–390)
PMV BLD AUTO: 10.3 FL (ref 8.9–12.7)
POTASSIUM SERPL-SCNC: 4 MMOL/L (ref 3.5–5.3)
PROT SERPL-MCNC: 7.1 G/DL (ref 6.4–8.4)
PROT UR STRIP-MCNC: NEGATIVE MG/DL
RBC # BLD AUTO: 4.34 MILLION/UL (ref 3.81–5.12)
SALICYLATES SERPL-MCNC: <5 MG/DL (ref 3–20)
SARS-COV-2 RNA RESP QL NAA+PROBE: NEGATIVE
SODIUM SERPL-SCNC: 141 MMOL/L (ref 135–147)
SP GR UR STRIP.AUTO: <1.005 (ref 1–1.03)
THC UR QL: NEGATIVE
TSH SERPL DL<=0.05 MIU/L-ACNC: 0.47 UIU/ML (ref 0.45–4.5)
UROBILINOGEN UR STRIP-ACNC: <2 MG/DL
WBC # BLD AUTO: 9.39 THOUSAND/UL (ref 4.31–10.16)

## 2024-04-25 PROCEDURE — 80307 DRUG TEST PRSMV CHEM ANLYZR: CPT | Performed by: STUDENT IN AN ORGANIZED HEALTH CARE EDUCATION/TRAINING PROGRAM

## 2024-04-25 PROCEDURE — 99285 EMERGENCY DEPT VISIT HI MDM: CPT | Performed by: STUDENT IN AN ORGANIZED HEALTH CARE EDUCATION/TRAINING PROGRAM

## 2024-04-25 PROCEDURE — 36415 COLL VENOUS BLD VENIPUNCTURE: CPT | Performed by: STUDENT IN AN ORGANIZED HEALTH CARE EDUCATION/TRAINING PROGRAM

## 2024-04-25 PROCEDURE — 84443 ASSAY THYROID STIM HORMONE: CPT | Performed by: STUDENT IN AN ORGANIZED HEALTH CARE EDUCATION/TRAINING PROGRAM

## 2024-04-25 PROCEDURE — 82077 ASSAY SPEC XCP UR&BREATH IA: CPT | Performed by: STUDENT IN AN ORGANIZED HEALTH CARE EDUCATION/TRAINING PROGRAM

## 2024-04-25 PROCEDURE — 81025 URINE PREGNANCY TEST: CPT | Performed by: STUDENT IN AN ORGANIZED HEALTH CARE EDUCATION/TRAINING PROGRAM

## 2024-04-25 PROCEDURE — 81003 URINALYSIS AUTO W/O SCOPE: CPT | Performed by: STUDENT IN AN ORGANIZED HEALTH CARE EDUCATION/TRAINING PROGRAM

## 2024-04-25 PROCEDURE — 96360 HYDRATION IV INFUSION INIT: CPT

## 2024-04-25 PROCEDURE — 84703 CHORIONIC GONADOTROPIN ASSAY: CPT | Performed by: STUDENT IN AN ORGANIZED HEALTH CARE EDUCATION/TRAINING PROGRAM

## 2024-04-25 PROCEDURE — 99285 EMERGENCY DEPT VISIT HI MDM: CPT

## 2024-04-25 PROCEDURE — 80053 COMPREHEN METABOLIC PANEL: CPT | Performed by: STUDENT IN AN ORGANIZED HEALTH CARE EDUCATION/TRAINING PROGRAM

## 2024-04-25 PROCEDURE — 70487 CT MAXILLOFACIAL W/DYE: CPT

## 2024-04-25 PROCEDURE — 80143 DRUG ASSAY ACETAMINOPHEN: CPT | Performed by: STUDENT IN AN ORGANIZED HEALTH CARE EDUCATION/TRAINING PROGRAM

## 2024-04-25 PROCEDURE — 96361 HYDRATE IV INFUSION ADD-ON: CPT

## 2024-04-25 PROCEDURE — 93005 ELECTROCARDIOGRAM TRACING: CPT

## 2024-04-25 PROCEDURE — 85025 COMPLETE CBC W/AUTO DIFF WBC: CPT | Performed by: STUDENT IN AN ORGANIZED HEALTH CARE EDUCATION/TRAINING PROGRAM

## 2024-04-25 PROCEDURE — 87635 SARS-COV-2 COVID-19 AMP PRB: CPT | Performed by: STUDENT IN AN ORGANIZED HEALTH CARE EDUCATION/TRAINING PROGRAM

## 2024-04-25 PROCEDURE — 70544 MR ANGIOGRAPHY HEAD W/O DYE: CPT

## 2024-04-25 PROCEDURE — 80179 DRUG ASSAY SALICYLATE: CPT | Performed by: STUDENT IN AN ORGANIZED HEALTH CARE EDUCATION/TRAINING PROGRAM

## 2024-04-25 RX ORDER — AMOXICILLIN AND CLAVULANATE POTASSIUM 875; 125 MG/1; MG/1
1 TABLET, FILM COATED ORAL EVERY 12 HOURS
Qty: 14 TABLET | Refills: 0 | Status: SHIPPED | OUTPATIENT
Start: 2024-04-25 | End: 2024-05-02

## 2024-04-25 RX ORDER — LORAZEPAM 1 MG/1
1 TABLET ORAL ONCE
Status: COMPLETED | OUTPATIENT
Start: 2024-04-25 | End: 2024-04-25

## 2024-04-25 RX ORDER — AMOXICILLIN AND CLAVULANATE POTASSIUM 875; 125 MG/1; MG/1
1 TABLET, FILM COATED ORAL ONCE
Status: COMPLETED | OUTPATIENT
Start: 2024-04-25 | End: 2024-04-25

## 2024-04-25 RX ORDER — OLANZAPINE 5 MG/1
10 TABLET, ORALLY DISINTEGRATING ORAL ONCE
Status: COMPLETED | OUTPATIENT
Start: 2024-04-25 | End: 2024-04-25

## 2024-04-25 RX ORDER — CIPROFLOXACIN AND DEXAMETHASONE 3; 1 MG/ML; MG/ML
4 SUSPENSION/ DROPS AURICULAR (OTIC) 2 TIMES DAILY
Status: DISCONTINUED | OUTPATIENT
Start: 2024-04-25 | End: 2024-04-26 | Stop reason: HOSPADM

## 2024-04-25 RX ORDER — AMOXICILLIN AND CLAVULANATE POTASSIUM 875; 125 MG/1; MG/1
1 TABLET, FILM COATED ORAL EVERY 12 HOURS SCHEDULED
Status: DISCONTINUED | OUTPATIENT
Start: 2024-04-26 | End: 2024-04-25

## 2024-04-25 RX ORDER — CIPROFLOXACIN AND DEXAMETHASONE 3; 1 MG/ML; MG/ML
4 SUSPENSION/ DROPS AURICULAR (OTIC) 2 TIMES DAILY
Qty: 3 ML | Refills: 0 | Status: SHIPPED | OUTPATIENT
Start: 2024-04-25 | End: 2024-05-02

## 2024-04-25 RX ORDER — AMOXICILLIN AND CLAVULANATE POTASSIUM 875; 125 MG/1; MG/1
1 TABLET, FILM COATED ORAL EVERY 12 HOURS SCHEDULED
Status: DISCONTINUED | OUTPATIENT
Start: 2024-04-26 | End: 2024-04-26 | Stop reason: HOSPADM

## 2024-04-25 RX ADMIN — CIPROFLOXACIN AND DEXAMETHASONE 4 DROP: 3; 1 SUSPENSION/ DROPS AURICULAR (OTIC) at 20:19

## 2024-04-25 RX ADMIN — SODIUM CHLORIDE 1000 ML: 0.9 INJECTION, SOLUTION INTRAVENOUS at 18:02

## 2024-04-25 RX ADMIN — AMOXICILLIN AND CLAVULANATE POTASSIUM 1 TABLET: 875; 125 TABLET, FILM COATED ORAL at 18:03

## 2024-04-25 RX ADMIN — OLANZAPINE 10 MG: 5 TABLET, ORALLY DISINTEGRATING ORAL at 13:51

## 2024-04-25 RX ADMIN — LORAZEPAM 1 MG: 1 TABLET ORAL at 13:51

## 2024-04-25 RX ADMIN — IOHEXOL 85 ML: 350 INJECTION, SOLUTION INTRAVENOUS at 14:20

## 2024-04-25 NOTE — ED NOTES
Patient Belongings;  1 pair black eyeglasses,I belly ring, 1 pair of jeans, 12 bras, 5 underwear,9 shirts,8 pants,2 sweatjackets,1 pair flip flops, 2 pairs of white shoes,4 pairs socks,1 black cellphone,1  cord with adaptor,1 pack cigarettes, 2 electronic devices,1 contact case, 1 black back pack,3 lighters, 2 notebooks, pens,1 can of hairspray, 1 bottle shampoo,1 makeup bag with makeup inside,1 razor, 1 bottle perfume, 1 box of contact care, 1 container of tic Tacs, 1 water bottle fortino Aleman, NAHED  04/25/24 8623

## 2024-04-25 NOTE — ED PROVIDER NOTES
"History  Chief Complaint   Patient presents with    Psychiatric Evaluation     Patient states dropped off by mother with whom she lives. Patient looking around self and around room and when questions asked no eye contact talking elsewhere and states is seeing and hearing things. Very hard to interview, states using PCP, crack cocaine and states she is drunk right now. Cannot keep on task of answering questions and following directions. Purulent drainage left ear. States she took a overdose of Ibuprofen 12-15 tablets last night and states went to hospital for self harm .     Detox Evaluation     Unable to keep patient focused on answering how much she drinks. Drinks whiskey everyday. Uses crack cocaine and took PCP.      Patient is a 34-year-old female, past medical history including alcohol abuse, opioid dependence, and unspecified \"psychiatric disorder\", who presents the emergency department for behavior health evaluation.  Per triage patient was dropped off by her mother.  Patient admits to using crack cocaine, PCP, and drinking alcohol.  She states she attempted to kill herself last night.  When asked how she states she took \"12-15...\" Then trails off.  She did mention Motrin but unclear if this was what she took.  States she is still feeling suicidal.  States she is also homicidal but would like to hurt herself more than anybody else.  States she is actively hallucinating (as well as seeing and hearing things, and is continually staring around the room responding to stimuli).  No somatic complaints (however, drainage noted from left ear.  Per chart review patient was seen in the emergency department about 2 months ago for otitis externa and started on Ciprodex.  Unclear if patient used the drops.  No other complaints or concerns.        Prior to Admission Medications   Prescriptions Last Dose Informant Patient Reported? Taking?   ciprofloxacin-dexamethasone (CIPRODEX) otic suspension   No No   Sig: Administer 4 " "drops into both ears 2 (two) times a day for 7 days   fluticasone (FLONASE) 50 mcg/act nasal spray   No No   Si spray into each nostril daily   lamoTRIgine (LaMICtal) 200 MG tablet   No No   Sig: Take 1 tablet (200 mg total) by mouth daily for 14 days   lamoTRIgine (LaMICtal) 200 MG tablet   No No   Sig: Take 1 tablet (200 mg total) by mouth daily for 14 days      Facility-Administered Medications: None       Past Medical History:   Diagnosis Date    ETOH abuse     Opiate dependence (HCC)     suboxone therapy    Polydrug abuse (HCC)     Psychiatric disorder        Past Surgical History:   Procedure Laterality Date     SECTION         History reviewed. No pertinent family history.  I have reviewed and agree with the history as documented.    E-Cigarette/Vaping    E-Cigarette Use Current Every Day User      E-Cigarette/Vaping Substances    Nicotine Yes     Flavoring Yes      Social History     Tobacco Use    Smoking status: Every Day     Current packs/day: 0.25     Types: Cigarettes    Smokeless tobacco: Never   Vaping Use    Vaping status: Every Day    Substances: Nicotine, Flavoring   Substance Use Topics    Alcohol use: Yes     Comment: \" liqour daily \"    Drug use: Yes     Types: \"Crack\" cocaine, PCP     Comment: suboxone,       Review of Systems   Unable to perform ROS: Psychiatric disorder       Physical Exam  Physical Exam  Vitals and nursing note reviewed.   Constitutional:       General: She is not in acute distress.     Appearance: She is well-developed. She is not ill-appearing, toxic-appearing or diaphoretic.   HENT:      Head: Normocephalic and atraumatic.      Comments: Left purulent drainage from external auditory canal noted. Difficulty visualizing TM.     Right Ear: External ear normal.      Left Ear: External ear normal.      Nose: Nose normal.   Eyes:      General: Lids are normal. No scleral icterus.  Cardiovascular:      Rate and Rhythm: Normal rate and regular rhythm.      Heart " sounds: Normal heart sounds. No murmur heard.     No friction rub. No gallop.   Pulmonary:      Effort: Pulmonary effort is normal. No respiratory distress.      Breath sounds: Normal breath sounds. No wheezing or rales.   Abdominal:      Palpations: Abdomen is soft.      Tenderness: There is no abdominal tenderness. There is no guarding or rebound.   Musculoskeletal:         General: No deformity. Normal range of motion.      Cervical back: Normal range of motion and neck supple.   Skin:     General: Skin is warm and dry.   Neurological:      General: No focal deficit present.      Mental Status: She is alert.   Psychiatric:         Mood and Affect: Mood normal.         Behavior: Behavior normal.         Vital Signs  ED Triage Vitals   Temperature Pulse Respirations Blood Pressure SpO2   04/25/24 1331 04/25/24 1331 04/25/24 1333 04/25/24 1331 04/25/24 1331   98.2 °F (36.8 °C) 86 18 119/66 99 %      Temp Source Heart Rate Source Patient Position - Orthostatic VS BP Location FiO2 (%)   04/25/24 1331 04/25/24 1331 -- 04/25/24 1331 --   Oral Monitor  Right arm       Pain Score       --                  Vitals:    04/25/24 1331 04/26/24 0622   BP: 119/66 106/57   Pulse: 86 59         Visual Acuity      ED Medications  Medications   ciprofloxacin-dexamethasone (CIPRODEX) 0.3-0.1 % otic suspension 4 drop (4 drops Left Ear Given 4/25/24 2019)   amoxicillin-clavulanate (AUGMENTIN) 875-125 mg per tablet 1 tablet (has no administration in time range)   LORazepam (ATIVAN) tablet 1 mg (1 mg Oral Given 4/25/24 1351)   OLANZapine (ZyPREXA ZYDIS) dispersible tablet 10 mg (10 mg Oral Given 4/25/24 1351)   iohexol (OMNIPAQUE) 350 MG/ML injection (MULTI-DOSE) 85 mL (85 mL Intravenous Given 4/25/24 1420)   sodium chloride 0.9 % bolus 1,000 mL (0 mL Intravenous Stopped 4/25/24 2002)   amoxicillin-clavulanate (AUGMENTIN) 875-125 mg per tablet 1 tablet (1 tablet Oral Given 4/25/24 1803)       Diagnostic Studies  Results Reviewed        Procedure Component Value Units Date/Time    TSH [[id number]]  (Normal) Collected: 04/25/24 1410    Lab Status: Final result Specimen: Blood from Arm, Left Updated: 04/25/24 1450     TSH 3RD GENERATON 0.465 uIU/mL     hCG, qualitative pregnancy [[id number]]  (Normal) Collected: 04/25/24 1410    Lab Status: Final result Specimen: Blood from Arm, Left Updated: 04/25/24 1450     Preg, Serum Negative    Salicylate level [[id number]]  (Normal) Collected: 04/25/24 1410    Lab Status: Final result Specimen: Blood from Arm, Left Updated: 04/25/24 1450     Salicylate Lvl <5 mg/dL     COVID only [[id number]]  (Normal) Collected: 04/25/24 1410    Lab Status: Final result Specimen: Nares from Nose Updated: 04/25/24 1449     SARS-CoV-2 Negative    Narrative:      FOR PEDIATRIC PATIENTS - copy/paste COVID Guidelines URL to browser: [email or web address]    SARS-CoV-2 assay is a Nucleic Acid Amplification assay intended for the  qualitative detection of nucleic acid from SARS-CoV-2 in nasopharyngeal  swabs. Results are for the presumptive identification of SARS-CoV-2 RNA.    Positive results are indicative of infection with SARS-CoV-2, the virus  causing COVID-19, but do not rule out bacterial infection or co-infection  with other viruses. Laboratories within the [place] and its  territories are required to report all positive results to the appropriate  public health authorities. Negative results do not preclude SARS-CoV-2  infection and should not be used as the sole basis for treatment or other  patient management decisions. Negative results must be combined with  clinical observations, patient history, and epidemiological information.  This test has not been FDA cleared or approved.    This test has been authorized by FDA under an Emergency Use Authorization  (EUA). This test is only authorized for the duration of time the  declaration that circumstances exist justifying  the authorization of the  emergency use of an in vitro diagnostic tests for detection of SARS-CoV-2  virus and/or diagnosis of COVID-19 infection under section 564(b)(1) of  the Act, 21 U.S.C. 360bbb-3(b)(1), unless the authorization is terminated  or revoked sooner. The test has been validated but independent review by FDA  and CLIA is pending.    Test performed using [employer] GeneXpert: This RT-PCR assay targets N2,  a region unique to SARS-CoV-2. A conserved region in the E-gene was chosen  for pan-Sarbecovirus detection which includes SARS-CoV-2.    According to CMS-2020-01-R, this platform meets the definition of high-throughput technology.    POCT pregnancy, urine [[id number]]  (Normal) Resulted: 04/25/24 1358    Lab Status: Final result Specimen: Urine Updated: 04/25/24 1448     EXT Preg Test, Ur Negative     Control Valid    Comprehensive metabolic panel [[id number]]  (Abnormal) Collected: 04/25/24 1410    Lab Status: Final result Specimen: Blood from Arm, Left Updated: 04/25/24 1441     Sodium 141 mmol/L      Potassium 4.0 mmol/L      Chloride 110 mmol/L      CO2 21 mmol/L      ANION GAP 10 mmol/L      BUN 9 mg/dL      Creatinine 0.70 mg/dL      Glucose 86 mg/dL      Calcium 9.2 mg/dL      AST 10 U/L      ALT 9 U/L      Alkaline Phosphatase 77 U/L      Total Protein 7.1 g/dL      Albumin 4.3 g/dL      Total Bilirubin 0.19 mg/dL      eGFR 115 ml/min/1.73sq m     Narrative:      National Kidney Disease Foundation guidelines for Chronic Kidney Disease (CKD):     Stage 1 with normal or high GFR (GFR > 90 mL/min/1.73 square meters)    Stage 2 Mild CKD (GFR = 60-89 mL/min/1.73 square meters)    Stage 3A Moderate CKD (GFR = 45-59 mL/min/1.73 square meters)    Stage 3B Moderate CKD (GFR = 30-44 mL/min/1.73 square meters)    Stage 4 Severe CKD (GFR = 15-29 mL/min/1.73 square meters)    Stage 5 End Stage CKD (GFR <15 mL/min/1.73 square meters)  Note: GFR calculation is accurate only with a steady state creatinine     Acetaminophen level-If concentration is detectable, please discuss with medical  on call. [[id number]]  (Abnormal) Collected: 04/25/24 1410    Lab Status: Final result Specimen: Blood from Arm, Left Updated: 04/25/24 1441     Acetaminophen Level <2 ug/mL     Ethanol [[id number]]  (Abnormal) Collected: 04/25/24 1410    Lab Status: Final result Specimen: Blood from Arm, Left Updated: 04/25/24 1434     Ethanol Lvl 142 mg/dL     Rapid drug screen, urine [[id number]]  (Abnormal) Collected: 04/25/24 1358    Lab Status: Final result Specimen: Urine, Clean Catch Updated: 04/25/24 1424     Amph/Meth UR Negative     Barbiturate Ur Negative     Benzodiazepine Urine Negative     Cocaine Urine Positive     Methadone Urine Negative     Opiate Urine Negative     PCP Ur Negative     THC Urine Negative     Oxycodone Urine Negative     Fentanyl Urine Negative     HYDROCODONE URINE Negative    Narrative:      Presumptive report. If requested, specimen will be sent to reference lab for confirmation.  FOR MEDICAL PURPOSES ONLY.   IF CONFIRMATION NEEDED PLEASE CONTACT THE LAB WITHIN 5 DAYS.    Drug Screen Cutoff Levels:  AMPHETAMINE/METHAMPHETAMINES  1000 ng/mL  BARBITURATES     200 ng/mL  BENZODIAZEPINES     200 ng/mL  COCAINE      300 ng/mL  METHADONE      300 ng/mL  OPIATES      300 ng/mL  PHENCYCLIDINE     25 ng/mL  THC       50 ng/mL  OXYCODONE      100 ng/mL  FENTANYL      5 ng/mL  HYDROCODONE     300 ng/mL    CBC and differential [[id number]] Collected: 04/25/24 1410    Lab Status: Final result Specimen: Blood from Arm, Left Updated: 04/25/24 1416     WBC 9.39 Thousand/uL      RBC 4.34 Million/uL      Hemoglobin 12.7 g/dL      Hematocrit 39.2 %      MCV 90 fL      MCH 29.3 pg      MCHC 32.4 g/dL      RDW 12.7 %      MPV 10.3 fL      Platelets 344 Thousands/uL      nRBC 0 /100 WBCs      Segmented % 60 %      Immature Grans % 0 %      Lymphocytes % 30 %      Monocytes % 8 %      Eosinophils Relative 1 %      Basophils  Relative 1 %      Absolute Neutrophils 5.58 Thousands/µL      Absolute Immature Grans 0.03 Thousand/uL      Absolute Lymphocytes 2.84 Thousands/µL      Absolute Monocytes 0.75 Thousand/µL      Eosinophils Absolute 0.13 Thousand/µL      Basophils Absolute 0.06 Thousands/µL     UA w Reflex to Microscopic w Reflex to Culture [[id number]]  (Abnormal) Collected: 04/25/24 1358    Lab Status: Final result Specimen: Urine, Clean Catch Updated: 04/25/24 1408     Color, UA Colorless     Clarity, UA Clear     Specific Gravity, UA <1.005     pH, UA 5.5     Leukocytes, UA Negative     Nitrite, UA Negative     Protein, UA Negative mg/dl      Glucose, UA Negative mg/dl      Ketones, UA Negative mg/dl      Urobilinogen, UA <2.0 mg/dl      Bilirubin, UA Negative     Occult Blood, UA Negative                   MRV head wo contrast   Final Result by Sergio Farrell MD (04/25 1926)      No evidence of dural venous sinus thrombosis.               Workstation performed: [id number]         CT facial bones with contrast   Final Result by Victoriano Be DO (04/25 1609)      Complete opacification of the left mastoid temporal bone with focal erosion of the inferior medial wall. Although the adjacent sigmoid sinus is opacified there is an area of low density between the bone and the sinus which could represent partial    thrombosis, nonocclusive. Possible component of external otitis with thickening of the external auditory canal soft tissues.      Reactive cervical adenopathy, left greater than right.         Workstation performed: [id number]                    Procedures  ECG 12 Lead Documentation Only    Date/Time: 4/25/2024 7:45 PM    Performed by: Camacho Moseley DO  Authorized by: Camacho Moseley DO    ECG reviewed by me, the ED Provider: yes    Patient location:  ED  Interpretation:     Interpretation: normal    Rate:     ECG rate:  80    ECG rate assessment: normal    Rhythm:     Rhythm: sinus rhythm    Ectopy:      Ectopy: none    QRS:     QRS axis:  Normal  Conduction:     Conduction: normal    ST segments:     ST segments:  Normal  T waves:     T waves: normal             ED Course  ED Course as of 04/26/24 0704   u Apr 25, 2024   1425 COCAINE URINE(!): Positive   1434 ETHANOL(!): 142   1612 CT facial bones with contrast  Complete opacification of the left mastoid temporal bone with focal erosion of the inferior medial wall. Although the adjacent sigmoid sinus is opacified there is an area of low density between the bone and the sinus which could represent partial   thrombosis, nonocclusive. Possible component of external otitis with thickening of the external auditory canal soft tissues.     Reactive cervical adenopathy, left greater than right.     1635 Discussed with both ENT and neurosurgery.  ENT states likely chronic otitis media and component of otitis externa.  Will likely need tympanostomy tubes.  Will need follow-up as an outpatient.  Can treat with antibiotics in the meantime.  Neurosurgery recommending CTV   1641 Discussed with radiology.  Approval for MRV granted.   1929 MRV head wo contrast  No evidence of dural venous sinus thrombosis.   1929 Patient medically cleared for behavioral health evaluation   2000 Pt signed out to Dr Mane. Pending crisis eval                               SBIRT 20yo+      Flowsheet Row Most Recent Value   Initial Alcohol Screen: US AUDIT-C     1. How often do you have a drink containing alcohol? 6 Filed at: 04/25/2024 1337   2. How many drinks containing alcohol do you have on a typical day you are drinking?  6 Filed at: 04/25/2024 1337   Audit-C Score 12 Filed at: 04/25/2024 1337   Full Alcohol Screen: US AUDIT    4. How often during the last year have you found that you were not able to stop drinking once you had started? --  [Unable to interview due to paranoia, states use of PCP and crack cocaine] Filed at: 04/25/2024 1337   SHABNAM: How many times in the past year have  "you...    Used an illegal drug or used a prescription medication for non-medical reasons? Daily or Almost Daily Filed at: 04/25/2024 4894                      Medical Decision Making  Patient is a 34 y.o. female who presents to the ED for suicidal ideation, intoxication.  Patient is nontoxic, well-appearing.  Vitals are stable.  On exam she is exhibiting flight of ideas, responding to internal stimuli.    Initial consideration in this patient included major depressive disorder, adjustment disorder, suicide attempt (intentional overdose and self-injury amongst others), alcohol or other substance related mood disorders, and organic causes of altered mental status among others.  Low suspicion for intracranial abscess but given discharge from ear this is on the differential.    Will order labs, obtain CT imaging of head, and reassess.  Once medically cleared will have crisis evaluate             Portions of the record may have been created with voice recognition software. Occasional wrong word or \"sound a like\" substitutions may have occurred due to the inherent limitations of voice recognition software. Read the chart carefully and recognize, using context, where substitutions have occurred.    Amount and/or Complexity of Data Reviewed  Labs: ordered. Decision-making details documented in ED Course.  Radiology: ordered. Decision-making details documented in ED Course.    Risk  Prescription drug management.  Decision regarding hospitalization.             Disposition  Final diagnoses:   Otitis media   Otitis externa   Alcohol abuse   Cocaine use   Suicidal ideation     Time reflects when diagnosis was documented in both MDM as applicable and the Disposition within this note       Time User Action Codes Description Comment    4/25/2024  7:49 PM Camacho Moseley Add [H66.90] Otitis media     4/25/2024  7:49 PM Camacho Moseley Add [H60.90] Otitis externa     4/25/2024  7:49 PM Camacho Moseley Add [F10.10] Alcohol abuse     " 4/25/2024  7:49 PM Camacho Moseley Add [F14.90] Cocaine use     4/25/2024  7:49 PM Camacho Moseley Add [R45.851] Suicidal ideation           ED Disposition       ED Disposition   Transfer to Behavioral Health Condition   --    Date/Time   Thu Apr 25, 2024 1439    Comment   Steffanie [place] should be transferred out to  and has been medically cleared.               MD Documentation      Flowsheet Row Most Recent Value   Patient Condition The patient has been stabilized such that within reasonable medical probability, no material deterioration of the patient condition or the condition of the unborn child(cristobal) is likely to result from the transfer   Reason for Transfer Level of Care needed not available at this facility   Benefits of Transfer Specialized equipment and/or services available at the receiving facility (Include comment)________________________   Risks of Transfer Potential for delay in receiving treatment   Accepting Physician Dr. Quezada   Accepting Facility Name, [facility]   Sending MD Mane   Provider Certification General risk, such as traffic hazards, adverse weather conditions, rough terrain or turbulence, possible failure of equipment (including vehicle or aircraft), or consequences of actions of persons outside the control of the transport personnel          RN Documentation      Flowsheet Row Most Recent Value   Accepting Facility Name, [facility]          Follow-up Information       Follow up With Specialties Details Why Contact Info Additional Information    [employer]  Call in 1 day  [phone]       [facility] Emergency Medicine   [address] [facility] [id number] [phone] [facility] Emergency Department, [address], [place], [address]    [facility] [place] [facility] Schedule an appointment as soon as possible for a visit   [address] [place] [address]  [address]  [facility] [id number] [phone] [facility] [place], [address] [facility], [place], [address], [phone]            Patient's Medications   Discharge Prescriptions    AMOXICILLIN-CLAVULANATE (AUGMENTIN) 875-125 MG PER TABLET    Take 1 tablet by mouth every 12 (twelve) hours for 7 days       Start Date: 4/25/2024 End Date: 5/2/2024       Order Dose: 1 tablet       Quantity: 14 tablet    Refills: 0    CIPROFLOXACIN-DEXAMETHASONE (CIPRODEX) OTIC SUSPENSION    Administer 4 drops into the left ear 2 (two) times a day for 7 days       Start Date: 4/25/2024 End Date: 5/2/2024       Order Dose: 4 drops       Quantity: 3 mL    Refills: 0           PDMP Review       None            ED Provider  Electronically Signed by             Camacho Moseley DO  04/26/24 0704

## 2024-04-25 NOTE — ED NOTES
Unable to remove pts engagement ring and tongue ring pt was redirected and will try again later       Nallely Blue, RN  04/25/24 7667

## 2024-04-25 NOTE — ED NOTES
Pt cooperative to remove tongue ring. Pt removes ring. Both placed in cup and put together with belongings. Pt marked ready for MRI, Dr Moseley spoke with them regarding getting this done now.      Joanie Samayoa RN  04/25/24 1701       Joanie Samayoa RN  04/25/24 1710

## 2024-04-26 VITALS
HEART RATE: 59 BPM | RESPIRATION RATE: 17 BRPM | SYSTOLIC BLOOD PRESSURE: 106 MMHG | OXYGEN SATURATION: 97 % | DIASTOLIC BLOOD PRESSURE: 57 MMHG | TEMPERATURE: 98.7 F

## 2024-04-26 NOTE — ED NOTES
4/26/24 @ 0754:  PES provided update to Verna at [facility].  PES notes that nurse to nurse was completed.  MS Jennifer    8383: SDM arrived and patient transported without incident.  MS Jennifer

## 2024-04-26 NOTE — ED NOTES
4/26/24 @ 1200:  PES informed that some of patients belongings were inadvertently left in the ED, so PES set up  to bring bag to patient.  Tracking number is:  [id number].  MS Jennifer

## 2024-04-26 NOTE — ED NOTES
"35 yo SWF presented to ER this afternoon with a BAL of 142 @ 14:10 and UDS + cocaine - requesting help for dual dx related issues which include +SI and a reported attempt last night of 10-15 tabs of Ibuprofen which the patient was seen @ [facility] ER and d/c'd.  The patient is known to PES.  Stressors: \"everything - I need help\".  Please note: patient was very lethargic for this assessment and had been previously medicated with Ativan 1 mg and Zyprexa 10 mg (PO).  Mood = # 6 - with 10=best mood and mood is \"unstable.  Symptoms include: chronic daily D/A abuse; +SI without current plan; poor sleep then reporting 10 hours; 20 pound weight loss over 2 weeks due to poor intake; poor concentration; \"bad\" energy level; poor self esteem; +hopelessness and anhedonia; anxiety - \"sweating; shaking; breathing changes; +A/V hallucinations which seem to be related to substance abuse - \"seeing angles - hearing voices - told them to stop following me; stop talking to me - stop looking at me\"; +paranoia; reports daily - etoh, crack cocaine and smoking PCP - also uses Kratom.  The patient denies: HI; any problems with social supports.  PES suspects non-compliance with outpt tx.      Patient is requesting inpt admission.  "

## 2024-04-26 NOTE — ED NOTES
Pt moved via wheelchair  from ED 2 to ED 12 IV pulled, 1:1 sitting -Rosangela Monroy RN  04/26/24 0014       Becky Monroy RN  04/26/24 0014

## 2024-04-26 NOTE — ED NOTES
Pt was  cooperative with room change. Was able to fall back asleep      Becky Monroy RN  04/26/24 0047

## 2024-04-26 NOTE — EMTALA/ACUTE CARE TRANSFER
[facility] EMERGENCY DEPARTMENT  [address] [address]  Dept: [phone]      EMTALA TRANSFER CONSENT    NAME Steffanie Jackson                                         1990                              MRN [id number]    I have been informed of my rights regarding examination, treatment, and transfer   by Dr. Marcelo Mane MD    Benefits: Specialized equipment and/or services available at the receiving facility (Include comment)________________________    Risks: Potential for delay in receiving treatment      Consent for Transfer:  I acknowledge that my medical condition has been evaluated and explained to me by the emergency department physician or other qualified medical person and/or my attending physician, who has recommended that I be transferred to the service of  Accepting Physician: Dr. Quezada at Accepting Facility Name, City & State : [facility]. The above potential benefits of such transfer, the potential risks associated with such transfer, and the probable risks of not being transferred have been explained to me, and I fully understand them.  The doctor has explained that, in my case, the benefits of transfer outweigh the risks.  I agree to be transferred.    I authorize the performance of emergency medical procedures and treatments upon me in both transit and upon arrival at the receiving facility.  Additionally, I authorize the release of any and all medical records to the receiving facility and request they be transported with me, if possible.  I understand that the safest mode of transportation during a medical emergency is an ambulance and that the Hospital advocates the use of this mode of transport. Risks of traveling to the receiving facility by car, including absence of medical control, life sustaining equipment, such as oxygen, and medical personnel has been explained to me and I fully understand them.    (JAROD CORRECT BOX BELOW)  [  ]  I consent to  the stated transfer and to be transported by ambulance/helicopter.  [  ]  I consent to the stated transfer, but refuse transportation by ambulance and accept full responsibility for my transportation by car.  I understand the risks of non-ambulance transfers and I exonerate the Hospital and its staff from any deterioration in my condition that results from this refusal.    X___________________________________________    DATE  24  TIME________  Signature of patient or legally responsible individual signing on patient behalf           RELATIONSHIP TO PATIENT_________________________          Provider Certification    NAME Steffanie Jackson                                         1990                              MRN [id number]    A medical screening exam was performed on the above named patient.  Based on the examination:    Condition Necessitating Transfer The primary encounter diagnosis was Otitis media. Diagnoses of Otitis externa, Alcohol abuse, Cocaine use, and Suicidal ideation were also pertinent to this visit.    Patient Condition: The patient has been stabilized such that within reasonable medical probability, no material deterioration of the patient condition or the condition of the unborn child(cristobal) is likely to result from the transfer    Reason for Transfer: Level of Care needed not available at this facility    Transfer Requirements: Facility [facility]   Space available and qualified personnel available for treatment as acknowledged by    Agreed to accept transfer and to provide appropriate medical treatment as acknowledged by       Dr. Quezada  Appropriate medical records of the examination and treatment of the patient are provided at the time of transfer   STAFF INITIAL WHEN COMPLETED _______  Transfer will be performed by qualified personnel from    and appropriate transfer equipment as required, including the use of necessary and appropriate life support measures.    Provider Certification:  I have examined the patient and explained the following risks and benefits of being transferred/refusing transfer to the patient/family:  General risk, such as traffic hazards, adverse weather conditions, rough terrain or turbulence, possible failure of equipment (including vehicle or aircraft), or consequences of actions of persons outside the control of the transport personnel      Based on these reasonable risks and benefits to the patient and/or the unborn child(cristobal), and based upon the information available at the time of the patient’s examination, I certify that the medical benefits reasonably to be expected from the provision of appropriate medical treatments at another medical facility outweigh the increasing risks, if any, to the individual’s medical condition, and in the case of labor to the unborn child, from effecting the transfer.    X____________________________________________ DATE 04/25/24        TIME_______      ORIGINAL - SEND TO MEDICAL RECORDS   COPY - SEND WITH PATIENT DURING TRANSFER

## 2024-04-26 NOTE — ED NOTES
No beds @ [facility].    20:35 - chart faxed to [facility] - called to verify receipt and beds are available.    20:50 - accepted SO by Dr Caesar Burnett; Rn report to call [phone].    20:55 - voluntary signed with patient.    21:00 - [facility] [phone] called for precert - phone would not even ring - tried [phone] - that did not ring either; called customer service @ [phone] - about 45 minutes on hold until a  picked up - Laila LICEA - 4 day authorization, 4/26 - 4/29/24 with concurrent review on 4/29/24 with Roya DEL RIO @  [phone]; authorization#: [id number].    22:20 - Round-trip ordered.  SDM @ 08:00 - ER and SO advised.

## 2024-04-29 LAB
ATRIAL RATE: 80 BPM
P AXIS: 65 DEGREES
PR INTERVAL: 148 MS
QRS AXIS: 47 DEGREES
QRSD INTERVAL: 92 MS
QT INTERVAL: 386 MS
QTC INTERVAL: 445 MS
T WAVE AXIS: 38 DEGREES
VENTRICULAR RATE: 80 BPM

## 2024-04-29 PROCEDURE — 93010 ELECTROCARDIOGRAM REPORT: CPT | Performed by: INTERNAL MEDICINE

## 2024-06-04 ENCOUNTER — APPOINTMENT (OUTPATIENT)
Dept: RADIOLOGY | Facility: CLINIC | Age: 34
End: 2024-06-04
Payer: COMMERCIAL

## 2024-06-04 ENCOUNTER — OFFICE VISIT (OUTPATIENT)
Age: 34
End: 2024-06-04
Payer: COMMERCIAL

## 2024-06-04 VITALS
BODY MASS INDEX: 28.17 KG/M2 | HEIGHT: 63 IN | SYSTOLIC BLOOD PRESSURE: 111 MMHG | DIASTOLIC BLOOD PRESSURE: 71 MMHG | WEIGHT: 159 LBS | HEART RATE: 48 BPM

## 2024-06-04 DIAGNOSIS — M87.072 AVASCULAR NECROSIS OF LEFT TALUS (HCC): ICD-10-CM

## 2024-06-04 DIAGNOSIS — M25.579 ANKLE PAIN, UNSPECIFIED CHRONICITY, UNSPECIFIED LATERALITY: Primary | ICD-10-CM

## 2024-06-04 DIAGNOSIS — H60.90 OTITIS EXTERNA: ICD-10-CM

## 2024-06-04 DIAGNOSIS — F17.200 TOBACCO USE DISORDER: ICD-10-CM

## 2024-06-04 DIAGNOSIS — M25.579 ANKLE PAIN, UNSPECIFIED CHRONICITY, UNSPECIFIED LATERALITY: ICD-10-CM

## 2024-06-04 PROCEDURE — 73610 X-RAY EXAM OF ANKLE: CPT

## 2024-06-04 PROCEDURE — 99203 OFFICE O/P NEW LOW 30 MIN: CPT | Performed by: STUDENT IN AN ORGANIZED HEALTH CARE EDUCATION/TRAINING PROGRAM

## 2024-06-04 RX ORDER — SERTRALINE HYDROCHLORIDE 25 MG/1
25 TABLET, FILM COATED ORAL DAILY
COMMUNITY
Start: 2024-05-29

## 2024-06-04 RX ORDER — BUPRENORPHINE AND NALOXONE 8; 2 MG/1; MG/1
FILM, SOLUBLE BUCCAL; SUBLINGUAL
COMMUNITY
Start: 2024-05-29

## 2024-06-04 RX ORDER — TRAZODONE HYDROCHLORIDE 50 MG/1
50 TABLET ORAL
COMMUNITY
Start: 2024-05-29

## 2024-06-04 NOTE — LETTER
June 4, 2024     Patient: Steffanie Jackson  YOB: 1990  Date of Visit: 6/4/2024      To Whom it May Concern:    Steffanie Jackson is under my professional care. Steffanie was seen in my office on 6/4/2024. Steffanie should not return to a place of employment where standing or walking for more than 10-minutes at a time is necessary.     If you have any questions or concerns, please don't hesitate to call.         Sincerely,          Refugio Harden DPM        CC: No Recipients

## 2024-06-04 NOTE — PROGRESS NOTES
This patient was seen on  6/4/24 .    My role is Foot , Ankle, and Wound Specialist    ASSESSMENT     Diagnoses and all orders for this visit:    Ankle pain, unspecified chronicity, unspecified laterality  -     XR ankle 3+ vw left; Future  -     Ankle Cude ankle/Ankle Brace    Otitis externa  -     Ambulatory Referral to Otolaryngology    Tobacco use disorder    Avascular necrosis of left talus (HCC)  -     CT lower extremity wo contrast right; Future  -     CT lower extremity wo contrast left; Future    Other orders  -     buprenorphine-naloxone (Suboxone) 8-2 mg; DISSOLVE 1 FILM UNDER THE TONGUE TWICE A DAY  -     sertraline (ZOLOFT) 25 mg tablet; Take 25 mg by mouth daily  -     traZODone (DESYREL) 50 mg tablet; Take 50 mg by mouth daily at bedtime         Problem List Items Addressed This Visit          Behavioral Health    Tobacco use disorder       Surgery/Wound/Pain    Ankle pain, unspecified chronicity, unspecified laterality - Primary    Relevant Orders    XR ankle 3+ vw left    Ankle Cude ankle/Ankle Brace     Other Visit Diagnoses       Otitis externa        Avascular necrosis of left talus (HCC)        Relevant Orders    CT lower extremity wo contrast right    CT lower extremity wo contrast left          PLAN  -Steffanie and I discussed her left ankle  -MRI of the left ankle from 2/3/2024 reviewed: Large area of avascular necrosis to the talar dome with surrounding bone marrow edema extending to the talar neck  -Rx ASO brace for increased stability for now until the patient's time of surgery  -Follow-up bilateral CT scans for total talus replacement planning  -We did also discuss her history of substance abuse as well as tobacco use, I talked to Steffanie and let her know that either of these would greatly impact her ability to properly heal from surgery.  She states she will attempt to stop using nicotine products prior to her next visit.  She plans to remain on buprenorphine for her history of substance  "abuse.  -Return to clinic 4 weeks for preoperative planning    X-ray of the left ankle from 6/4/2024 interpreted independently: Patchy areas of sclerosis noted throughout the talar dome.  There does appear to be slight talar dome collapse in comparison to previous radiographs consistent with patient's avascular necrosis.    SUBJECTIVE    Chief Complaint:  Left ankle pain     Patient ID: Steffanie Jackson     6/4/24: Steffanie is a pleasant 34-year-old female  with a past medical history of alcohol use disorder, tobacco use disorder, psoriasis who presents today for evaluation of her left ankle.  She states that she initially broke her left ankle back in August, 2023.  She states that she never had surgery on it.  She states that her left ankle has not been bothering her for the past 6 to 8 months.  She states it hurts all the time and after 4 5 PM she is unable to place any weight to the area.  She states that the ankle consistently swells and is discolored.  She states that she wears an Aircast on it which helps slightly but the pain becomes unbearable even with this.  She has also been taking ibuprofen to help with her pain.        The following portions of the patient's history were reviewed and updated as appropriate: allergies, current medications, past family history, past medical history, past social history, past surgical history and problem list.    Review of Systems   Constitutional: Negative.    HENT: Negative.     Respiratory: Negative.     Cardiovascular: Negative.    Gastrointestinal: Negative.    Musculoskeletal:  Positive for arthralgias and joint swelling.   Skin: Negative.    Neurological: Negative.          OBJECTIVE      /71   Pulse (!) 48   Ht 5' 3\" (1.6 m)   Wt 72.1 kg (159 lb)   BMI 28.17 kg/m²        Physical Exam  Constitutional:       Appearance: Normal appearance.   HENT:      Head: Normocephalic and atraumatic.   Eyes:      General:         Right eye: No discharge.         Left eye: No " discharge.   Cardiovascular:      Rate and Rhythm: Normal rate and regular rhythm.      Pulses:           Dorsalis pedis pulses are 2+ on the right side and 2+ on the left side.        Posterior tibial pulses are 2+ on the right side and 2+ on the left side.   Pulmonary:      Effort: Pulmonary effort is normal.      Breath sounds: Normal breath sounds.   Skin:     General: Skin is warm.      Capillary Refill: Capillary refill takes less than 2 seconds.   Neurological:      Sensory: Sensation is intact. No sensory deficit.         Vascular:  -DP and PT pulses intact b/l  -Capillary refill time <2 sec b/l  -Digital hair growth: Present  -Skin temp: WNL  -Doppler signals triphasic to DP, PT, Peroneal arteries    MSK:  -Pain on palpation positive to lateral talar process, lateral gutter, anterior tibiotalar joint, medial gutter, talar head and neck of the left foot  -No gross deformities noted  -MMT:   -Tibialis anterior: 5/5   -Tibialis posterior: 5/5   -Peroneal tendons: 5/5   -Triceps surae: 5/5  -Range of motion left lower extremity:   -First MTPJ: 60 to 65 degrees dorsiflexion, 20 degrees plantarflexion   -Talonavicular joint: Within normal limits, painful   -Subtalar joint: 18 degrees inversion, 8 degrees eversion, painful throughout range of motion   -Ankle joint: 5 degrees dorsiflexion, 15 degrees plantarflexion, painful throughout range of motion    Neuro:  -Light sensation intact bilaterally  -Protective sensation intact bilaterally    Derm:  -No lesions, abrasions, or open wounds noted  -Edema noted to the left ankle in comparison to the right

## 2024-06-12 ENCOUNTER — TELEPHONE (OUTPATIENT)
Dept: OBGYN CLINIC | Facility: CLINIC | Age: 34
End: 2024-06-12

## 2024-06-12 ENCOUNTER — TELEPHONE (OUTPATIENT)
Age: 34
End: 2024-06-12

## 2024-06-12 ENCOUNTER — HOSPITAL ENCOUNTER (EMERGENCY)
Facility: HOSPITAL | Age: 34
Discharge: HOME | End: 2024-06-12
Attending: EMERGENCY MEDICINE
Payer: COMMERCIAL

## 2024-06-12 ENCOUNTER — APPOINTMENT (EMERGENCY)
Dept: RADIOLOGY | Facility: HOSPITAL | Age: 34
End: 2024-06-12
Payer: COMMERCIAL

## 2024-06-12 ENCOUNTER — HOSPITAL ENCOUNTER (EMERGENCY)
Facility: HOSPITAL | Age: 34
Discharge: HOME | End: 2024-06-13
Attending: EMERGENCY MEDICINE
Payer: COMMERCIAL

## 2024-06-12 VITALS
OXYGEN SATURATION: 99 % | TEMPERATURE: 98.3 F | HEART RATE: 72 BPM | DIASTOLIC BLOOD PRESSURE: 81 MMHG | SYSTOLIC BLOOD PRESSURE: 127 MMHG | RESPIRATION RATE: 20 BRPM

## 2024-06-12 VITALS
HEART RATE: 66 BPM | DIASTOLIC BLOOD PRESSURE: 81 MMHG | SYSTOLIC BLOOD PRESSURE: 139 MMHG | OXYGEN SATURATION: 99 % | TEMPERATURE: 98.2 F | RESPIRATION RATE: 24 BRPM
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
## 2024-06-12 DIAGNOSIS — S00.83XA TRAUMATIC ECCHYMOSIS OF FACE, INITIAL ENCOUNTER: Primary | ICD-10-CM

## 2024-06-12 DIAGNOSIS — Z76.0 ENCOUNTER FOR MEDICATION REFILL: Primary | ICD-10-CM

## 2024-06-12 PROCEDURE — 99283 EMERGENCY DEPT VISIT LOW MDM: CPT | Performed by: PHYSICIAN ASSISTANT

## 2024-06-12 PROCEDURE — 99284 EMERGENCY DEPT VISIT MOD MDM: CPT

## 2024-06-12 PROCEDURE — 96372 THER/PROPH/DIAG INJ SC/IM: CPT

## 2024-06-12 PROCEDURE — 70450 CT HEAD/BRAIN W/O DYE: CPT

## 2024-06-12 PROCEDURE — 70486 CT MAXILLOFACIAL W/O DYE: CPT

## 2024-06-12 PROCEDURE — 99282 EMERGENCY DEPT VISIT SF MDM: CPT

## 2024-06-12 PROCEDURE — 99284 EMERGENCY DEPT VISIT MOD MDM: CPT | Performed by: EMERGENCY MEDICINE

## 2024-06-12 RX ORDER — KETOROLAC TROMETHAMINE 30 MG/ML
15 INJECTION, SOLUTION INTRAMUSCULAR; INTRAVENOUS ONCE
Status: COMPLETED | OUTPATIENT
Start: 2024-06-12 | End: 2024-06-12

## 2024-06-12 RX ADMIN — KETOROLAC TROMETHAMINE 15 MG: 30 INJECTION, SOLUTION INTRAMUSCULAR at 22:17

## 2024-06-12 NOTE — TELEPHONE ENCOUNTER
Pt requested work note be sent to her through email. I sent her work note to her via email through the fax machine. Sw pt to let her know that it has been sent over for her she was thankful.

## 2024-06-12 NOTE — TELEPHONE ENCOUNTER
Caller: Steffanie Jackson    Doctor: Dereck / [place]    Reason for call: Steffanie needs the letter that says she can not stand at work.  Can this be emailed to her?   She no longer has this note.  Thank you.     Call back#: [phone]

## 2024-06-12 NOTE — ED PROVIDER NOTES
History  Chief Complaint   Patient presents with    Medication Refill     Got out of rehab  needs suboxone refilled. Has appt      34-year-old female presenting today requesting a refill of her Suboxone, states that she got out of rehab on  and was given a short prescription however is now out.  States that she is called multiple facilities and could not get an appointment until .  Patient states that she is out of the area from 6/15 into July.         Prior to Admission Medications   Prescriptions Last Dose Informant Patient Reported? Taking?   buprenorphine-naloxone (Suboxone) 8-2 mg 2024  Yes Yes   Sig: DISSOLVE 1 FILM UNDER THE TONGUE TWICE A DAY   ciprofloxacin-dexamethasone (CIPRODEX) otic suspension   No No   Sig: Administer 4 drops into both ears 2 (two) times a day for 7 days   Patient not taking: Reported on 2024   ciprofloxacin-dexamethasone (CIPRODEX) otic suspension   No No   Sig: Administer 4 drops into the left ear 2 (two) times a day for 7 days   Patient not taking: Reported on 2024   fluticasone (FLONASE) 50 mcg/act nasal spray   No No   Si spray into each nostril daily   lamoTRIgine (LaMICtal) 200 MG tablet   No No   Sig: Take 1 tablet (200 mg total) by mouth daily for 14 days   lamoTRIgine (LaMICtal) 200 MG tablet   No No   Sig: Take 1 tablet (200 mg total) by mouth daily for 14 days   Patient not taking: Reported on 2024   sertraline (ZOLOFT) 25 mg tablet   Yes No   Sig: Take 25 mg by mouth daily   traZODone (DESYREL) 50 mg tablet   Yes No   Sig: Take 50 mg by mouth daily at bedtime      Facility-Administered Medications: None       Past Medical History:   Diagnosis Date    ETOH abuse     Opiate dependence (HCC)     suboxone therapy    Polydrug abuse (HCC)     Psychiatric disorder        Past Surgical History:   Procedure Laterality Date     SECTION         History reviewed. No pertinent family history.  I have reviewed and agree with the history as  documented.    E-Cigarette/Vaping    E-Cigarette Use Current Every Day User      E-Cigarette/Vaping Substances    Nicotine Yes     Flavoring Yes      Social History     Tobacco Use    Smoking status: Every Day     Current packs/day: 0.25     Types: Cigarettes    Smokeless tobacco: Never   Vaping Use    Vaping status: Every Day    Substances: Nicotine, Flavoring   Substance Use Topics    Alcohol use: Not Currently    Drug use: Yes     Comment: suboxone,       Review of Systems   Constitutional: Negative.  Negative for chills, fatigue and fever.   HENT: Negative.  Negative for congestion, postnasal drip, rhinorrhea and sore throat.    Eyes: Negative.    Respiratory: Negative.  Negative for cough, shortness of breath and wheezing.    Cardiovascular: Negative.    Gastrointestinal: Negative.  Negative for abdominal pain, diarrhea, nausea and vomiting.   Endocrine: Negative.    Genitourinary: Negative.    Musculoskeletal: Negative.    Skin: Negative.    Neurological: Negative.    Hematological: Negative.    Psychiatric/Behavioral: Negative.     All other systems reviewed and are negative.      Physical Exam  Physical Exam  Vitals and nursing note reviewed.   Constitutional:       Appearance: Normal appearance.   HENT:      Head: Normocephalic and atraumatic.      Right Ear: External ear normal.      Left Ear: External ear normal.      Nose: Nose normal.   Eyes:      Conjunctiva/sclera: Conjunctivae normal.   Cardiovascular:      Rate and Rhythm: Normal rate.   Pulmonary:      Effort: Pulmonary effort is normal.   Abdominal:      General: There is no distension.   Musculoskeletal:         General: No deformity. Normal range of motion.      Cervical back: Normal range of motion.   Skin:     General: Skin is dry.      Findings: No rash.   Neurological:      General: No focal deficit present.      Mental Status: She is alert and oriented to person, place, and time. Mental status is at baseline.   Psychiatric:         Mood and  Affect: Mood normal.         Behavior: Behavior normal.         Thought Content: Thought content normal.         Judgment: Judgment normal.         Vital Signs  ED Triage Vitals [06/12/24 1235]   Temperature Pulse Respirations Blood Pressure SpO2   98.2 °F (36.8 °C) 66 (!) 24 139/81 99 %      Temp Source Heart Rate Source Patient Position - Orthostatic VS BP Location FiO2 (%)   Tympanic Monitor Sitting Right arm --      Pain Score       --           Vitals:    06/12/24 1235   BP: 139/81   Pulse: 66   Patient Position - Orthostatic VS: Sitting         Visual Acuity      ED Medications  Medications - No data to display    Diagnostic Studies  Results Reviewed       None                   No orders to display              Procedures  Procedures         ED Course                                             Medical Decision Making  Discussed with patient that we do not refill Suboxone, this was also discussed with patient prior to arrival when she called the emergency department and spoke with one of our nurses here.  Discussed with patient that we do not administer Suboxone in the emergency department and she will need to follow-up with her PCP, she was given further resources by crisis department.    Patient is informed to return to the emergency department for worsening of symptoms and was given proper education regarding their diagnosis and symptoms. Otherwise the patient is informed to follow up with their primary care doctor for re-evaluation. The patient verbalizes understanding and agrees with above assessment and plan. All questions were answered.                   Disposition  Final diagnoses:   Encounter for medication refill     Time reflects when diagnosis was documented in both MDM as applicable and the Disposition within this note       Time User Action Codes Description Comment    6/12/2024  1:25 PM Chiara Lew Add [Z76.0] Encounter for medication refill           ED Disposition       ED  Disposition   Discharge    Condition   Stable    Date/Time   Wed Jun 12, 2024  1:25 PM    Comment   Steffanie Jackson discharge to home/self care.                   Follow-up Information    None         Discharge Medication List as of 6/12/2024  1:32 PM        CONTINUE these medications which have NOT CHANGED    Details   buprenorphine-naloxone (Suboxone) 8-2 mg DISSOLVE 1 FILM UNDER THE TONGUE TWICE A DAY, Historical Med      ciprofloxacin-dexamethasone (CIPRODEX) otic suspension Administer 4 drops into both ears 2 (two) times a day for 7 days, Starting Sun 2/25/2024, Until Sun 3/3/2024, Normal      ciprofloxacin-dexamethasone (CIPRODEX) otic suspension Administer 4 drops into the left ear 2 (two) times a day for 7 days, Starting Thu 4/25/2024, Until Thu 5/2/2024, Normal      fluticasone (FLONASE) 50 mcg/act nasal spray 1 spray into each nostril daily, Starting Mon 2/12/2024, Normal      lamoTRIgine (LaMICtal) 200 MG tablet Take 1 tablet (200 mg total) by mouth daily for 14 days, Starting Sat 1/27/2024, Until Tue 6/4/2024, Normal      lamoTRIgine (LaMICtal) 200 MG tablet Take 1 tablet (200 mg total) by mouth daily for 14 days, Starting Mon 2/12/2024, Until Mon 2/26/2024, Normal      sertraline (ZOLOFT) 25 mg tablet Take 25 mg by mouth daily, Starting Wed 5/29/2024, Historical Med      traZODone (DESYREL) 50 mg tablet Take 50 mg by mouth daily at bedtime, Starting Wed 5/29/2024, Historical Med             No discharge procedures on file.    PDMP Review       None            ED Provider  Electronically Signed by             Chiara Lew PA-C  06/12/24 6235
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
## 2024-06-13 NOTE — DISCHARGE INSTRUCTIONS
Thankfully, your CT scan did not show any signs of fracture.    Please continue tylenol and ibuprofen every 6 hours for your pain    Return to the ER for any new or worrisome symptoms

## 2024-07-01 ENCOUNTER — TELEPHONE (OUTPATIENT)
Age: 34
End: 2024-07-01

## 2024-07-23 ENCOUNTER — TELEPHONE (OUTPATIENT)
Dept: PODIATRY | Facility: CLINIC | Age: 34
End: 2024-07-23

## 2024-07-23 NOTE — TELEPHONE ENCOUNTER
Caller: Transfer call from Orthopedics    Doctor and/or Office: Refugio Harden DPM    #: [phone]    Escalation: Steffanie wanted a letter faxed somewhere but she hung up before I could get the fax number or where she wanted the letter to go.
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
## 2024-07-24 ENCOUNTER — TELEPHONE (OUTPATIENT)
Age: 34
End: 2024-07-24

## 2024-09-11 ENCOUNTER — TELEPHONE (OUTPATIENT)
Age: 34
End: 2024-09-11

## 2024-09-11 NOTE — TELEPHONE ENCOUNTER
SW Patient, made her aware that her medical release form was received and sent to [facility]. She was understanding and thankful.

## 2024-09-11 NOTE — TELEPHONE ENCOUNTER
Caller: Patient     Doctor: Dereck      Reason for call: Patient wanting to confirm that her medical release form was received and her request for her medical records has been processed ?    Please advise     Call back#: [phone]